# Patient Record
Sex: FEMALE | URBAN - METROPOLITAN AREA
[De-identification: names, ages, dates, MRNs, and addresses within clinical notes are randomized per-mention and may not be internally consistent; named-entity substitution may affect disease eponyms.]

---

## 2021-07-08 ENCOUNTER — TRANSFERRED RECORDS (OUTPATIENT)
Dept: HEALTH INFORMATION MANAGEMENT | Facility: CLINIC | Age: 36
End: 2021-07-08

## 2021-07-08 LAB
HPV ABSTRACT: NORMAL
PAP-ABSTRACT: NORMAL

## 2022-11-22 ENCOUNTER — OFFICE VISIT (OUTPATIENT)
Dept: INTERNAL MEDICINE | Facility: CLINIC | Age: 37
End: 2022-11-22
Payer: COMMERCIAL

## 2022-11-22 VITALS
DIASTOLIC BLOOD PRESSURE: 101 MMHG | BODY MASS INDEX: 28.24 KG/M2 | HEIGHT: 65 IN | SYSTOLIC BLOOD PRESSURE: 146 MMHG | OXYGEN SATURATION: 98 % | WEIGHT: 169.5 LBS | TEMPERATURE: 97.8 F | HEART RATE: 69 BPM

## 2022-11-22 DIAGNOSIS — I10 PRIMARY HYPERTENSION: ICD-10-CM

## 2022-11-22 DIAGNOSIS — F32.81 PMDD (PREMENSTRUAL DYSPHORIC DISORDER): ICD-10-CM

## 2022-11-22 DIAGNOSIS — R10.9 FLANK PAIN: Primary | ICD-10-CM

## 2022-11-22 LAB
ALBUMIN UR-MCNC: NEGATIVE MG/DL
APPEARANCE UR: CLEAR
BILIRUB UR QL STRIP: NEGATIVE
COLOR UR AUTO: NORMAL
GLUCOSE UR STRIP-MCNC: NEGATIVE MG/DL
HGB UR QL STRIP: NEGATIVE
KETONES UR STRIP-MCNC: NEGATIVE MG/DL
LEUKOCYTE ESTERASE UR QL STRIP: NEGATIVE
NITRATE UR QL: NEGATIVE
PH UR STRIP: 6 [PH] (ref 5–7)
RBC URINE: 1 /HPF
SP GR UR STRIP: 1 (ref 1–1.03)
SQUAMOUS EPITHELIAL: <1 /HPF
UROBILINOGEN UR STRIP-MCNC: NORMAL MG/DL
WBC URINE: <1 /HPF

## 2022-11-22 PROCEDURE — 99204 OFFICE O/P NEW MOD 45 MIN: CPT | Performed by: INTERNAL MEDICINE

## 2022-11-22 PROCEDURE — 81001 URINALYSIS AUTO W/SCOPE: CPT | Performed by: PATHOLOGY

## 2022-11-22 RX ORDER — LABETALOL 300 MG/1
300 TABLET, FILM COATED ORAL 2 TIMES DAILY
Qty: 60 TABLET | Refills: 3 | Status: SHIPPED | OUTPATIENT
Start: 2022-11-22 | End: 2023-07-13 | Stop reason: ALTCHOICE

## 2022-11-22 RX ORDER — NITROFURANTOIN 25; 75 MG/1; MG/1
CAPSULE ORAL
COMMUNITY
Start: 2022-10-15 | End: 2022-11-22

## 2022-11-22 RX ORDER — LABETALOL 200 MG/1
TABLET, FILM COATED ORAL
COMMUNITY
Start: 2022-09-16 | End: 2022-11-22

## 2022-11-22 RX ORDER — LABETALOL 200 MG/1
200 TABLET, FILM COATED ORAL 2 TIMES DAILY
COMMUNITY
Start: 2019-11-22 | End: 2022-11-22

## 2022-11-22 RX ORDER — NIFEDIPINE 60 MG/1
TABLET, EXTENDED RELEASE ORAL
COMMUNITY
Start: 2022-09-05 | End: 2022-11-22

## 2022-11-22 RX ORDER — VALACYCLOVIR HYDROCHLORIDE 500 MG/1
TABLET, FILM COATED ORAL
COMMUNITY
Start: 2022-09-05 | End: 2022-11-22

## 2022-11-22 RX ORDER — SENNOSIDES 8.6 MG
TABLET ORAL
COMMUNITY
Start: 2022-08-22 | End: 2024-02-13

## 2022-11-22 RX ORDER — VALACYCLOVIR HYDROCHLORIDE 500 MG/1
500 TABLET, FILM COATED ORAL PRN
COMMUNITY

## 2022-11-22 RX ORDER — AZELAIC ACID 0.15 G/G
GEL TOPICAL
COMMUNITY
Start: 2022-08-31 | End: 2022-11-22

## 2022-11-22 ASSESSMENT — ENCOUNTER SYMPTOMS
PALPITATIONS: 1
JAUNDICE: 0
INCREASED ENERGY: 1
HYPOTENSION: 0
BLOOD IN STOOL: 0
CHILLS: 0
WEIGHT GAIN: 0
HALLUCINATIONS: 0
NIGHT SWEATS: 0
BOWEL INCONTINENCE: 0
DIARRHEA: 0
COUGH DISTURBING SLEEP: 0
DIFFICULTY URINATING: 0
RECTAL PAIN: 0
NUMBNESS: 0
SPUTUM PRODUCTION: 0
POLYDIPSIA: 0
DYSPNEA ON EXERTION: 1
HEMOPTYSIS: 0
NAUSEA: 0
CONSTIPATION: 1
SLEEP DISTURBANCES DUE TO BREATHING: 0
COUGH: 0
POLYPHAGIA: 0
DECREASED APPETITE: 1
POSTURAL DYSPNEA: 0
VOMITING: 0
FATIGUE: 1
WEIGHT LOSS: 0
LOSS OF CONSCIOUSNESS: 0
FEVER: 0
DIZZINESS: 0
SHORTNESS OF BREATH: 1
LEG PAIN: 0
DISTURBANCES IN COORDINATION: 0
FLANK PAIN: 1
MEMORY LOSS: 0
HEARTBURN: 0
HYPERTENSION: 1
SEIZURES: 0
SNORES LOUDLY: 0
WHEEZING: 0
HEADACHES: 1
TINGLING: 0
SPEECH CHANGE: 0
TREMORS: 0
WEAKNESS: 0
SYNCOPE: 0
EXERCISE INTOLERANCE: 1
DYSURIA: 0
ORTHOPNEA: 0
LIGHT-HEADEDNESS: 0
ABDOMINAL PAIN: 0
BLOATING: 0
ALTERED TEMPERATURE REGULATION: 0
PARALYSIS: 0
HEMATURIA: 0

## 2022-11-22 NOTE — PATIENT INSTRUCTIONS
Increase labetalol to 300mg twice per day.  Check blood pressure at home at least three times per week and send a [employer] message in 2 weeks with an update.

## 2022-11-22 NOTE — PROGRESS NOTES
Assessment & Plan     Flank pain    Ines presents with ongoing R flank pain after being treated for UTI last month and was concerned about kidney infection.  U/A is normal today and exam shows some tenderness to palpation of the back, so pain is likely muscular.  Offered PT referral, but she is already scheduled for massage therapy and will proceed with this first.      - UA with Micro reflex to Culture; Future  - UA with Micro reflex to Culture    Primary hypertension    BP still high on labetalol 200mg BID.  She is trying to achieve pregnancy, so med options are limited.  Did not tolerate nifedipine due to side effects.  Discussed increasing labetalol, adding hydralazine or methyldopa.  We'll try increasing the labetalol first.  She will check BPs at home and send a [employer] message update in a couple of weeks.     - labetalol (NORMODYNE) 300 MG tablet; Take 1 tablet (300 mg) by mouth 2 times daily    PMDD (premenstrual dysphoric disorder)    Well controlled on current med, continue.     - FLUoxetine (PROZAC) 20 MG capsule; Take 1 capsule (20 mg) by mouth daily    Asked her to send vaccine records via [employer].      Thomas Tapia MD  [facility] INTERNAL MEDICINE [facility]   Ines is a 36 year old, presenting for the following health issues:  RECHECK (Establish care./Potential kidney infection.)    Recently moved here from [facility]     Genitourinary - Female  Onset/Duration: October  Description:   Painful urination (Dysuria): No           Frequency: No  Blood in urine (Hematuria): No.  Had some white flecks in the urine  Delay in urine (Hesitency): No  Intensity: mild  Progression of Symptoms:  Improved with nitrofurantoin  Accompanying Signs & Symptoms:  Fever/chills: No  Flank pain: YES- right, intermittent dull pain  Nausea and vomiting: No  Vaginal symptoms: none  Abdominal/Pelvic Pain: No  History:   History of frequent UTI s: YES- last was a couple of years ago.  She was  "previously on post-intercourse nitrofurantoin  History of kidney stones: No  Sexually Active: YES  Possibility of pregnancy: negative test on 11/14 and then she also got her period right after that  Precipitating or alleviating factors: None  Therapies tried and outcome:  She was seen in a [facility] in October with unclear results on urine test, treated with nitrofurantoin.  Continues to have flank pain    She is trying for pregnancy but has high blood pressure.  She is on labetalol 200mg BID;has not been on a higher dose in the past; she isn't sure how much this is helping.  Had swelling, photo sensitivity, migraines on nifedipine.      She was also on hydrochlorothiazide for a couple of years; didn't work very well. She was on atenolol-chlorthalidone in the past and this also helped migraines, but that was stopped with trying to achieve pregnancy.      She does not check BP at home often but when she does, it is high- usually around 130/150.  Just took an Excedrin with caffeine before this visit.    She is on fluoxetine for PMDD and that is working well.      She takes Valtrex prn for cold sores, can go for months to years without needing this.     She was previously on a statin for cholesterol but this is on hold while trying for pregnancy.  She is trying to eat healthy.  Cholesterol was checked in June while off medication and she says it was very high.      Last Pap was July 2021 per [facility].       Review of Systems   Constitutional,  systems are negative, except as otherwise noted.      Objective    BP (!) 146/101 (BP Location: Right arm, Patient Position: Sitting, Cuff Size: Adult Regular)   Pulse 69   Temp 97.8  F (36.6  C) (Oral)   Ht 1.651 m (5' 5\")   Wt 76.9 kg (169 lb 8 oz)   LMP 11/14/2022 (Exact Date)   SpO2 98%   BMI 28.21 kg/m    Body mass index is 28.21 kg/m .  Physical Exam     GENERAL: healthy, alert and no distress  RESP: lungs clear to auscultation - no rales, rhonchi or " wheezes  CV: regular rate and rhythm, normal S1 S2, no S3 or S4, no murmur, click or rub  ABDOMEN: soft, non-tender, no hepatosplenomegaly, no masses and bowel sounds normal  BACK: no CVA tenderness, has some mild tenderness to palpation of the lower right thoracic back    Results for orders placed or performed in visit on 11/22/22 (from the past 24 hour(s))   UA with Micro reflex to Culture    Specimen: Urine, Midstream   Result Value Ref Range    Color Urine Straw Colorless, Straw, Light Yellow, Yellow    Appearance Urine Clear Clear    Glucose Urine Negative Negative mg/dL    Bilirubin Urine Negative Negative    Ketones Urine Negative Negative mg/dL    Specific Gravity Urine 1.003 1.003 - 1.035    Blood Urine Negative Negative    pH Urine 6.0 5.0 - 7.0    Protein Albumin Urine Negative Negative mg/dL    Urobilinogen Urine Normal Normal, 2.0 mg/dL    Nitrite Urine Negative Negative    Leukocyte Esterase Urine Negative Negative    RBC Urine 1 <=2 /HPF    WBC Urine <1 <=5 /HPF    Squamous Epithelials Urine <1 <=1 /HPF    Narrative    Urine Culture not indicated

## 2022-11-23 NOTE — NURSING NOTE
"Ines Conte is a 36 year old female patient that presents today in clinic for the following:    Chief Complaint   Patient presents with     RECHECK     Establish care.  Potential kidney infection.     The patient's allergies and medications were reviewed as noted. A set of vitals were recorded as noted without incident: BP (!) 146/101 (BP Location: Right arm, Patient Position: Sitting, Cuff Size: Adult Regular)   Pulse 69   Temp 97.8  F (36.6  C) (Oral)   Ht 1.651 m (5' 5\")   Wt 76.9 kg (169 lb 8 oz)   LMP 11/14/2022 (Exact Date)   SpO2 98%   BMI 28.21 kg/m  . The patient does not have any other questions for the provider.    Sneha Salcedo, EMT at 6:39 PM on 11/22/2022.  Primary care clinic: [phone]  "

## 2023-06-21 ENCOUNTER — TELEPHONE (OUTPATIENT)
Dept: INTERNAL MEDICINE | Facility: CLINIC | Age: 38
End: 2023-06-21
Payer: COMMERCIAL

## 2023-06-21 NOTE — TELEPHONE ENCOUNTER
[facility] Call Center    Phone Message    May a detailed message be left on voicemail: yes     Reason for Call: Other: Patient calling requsting a call back to discuss medication changes. Please call thank you.      Action Taken: Message routed to:  [facility] pcc    Travel Screening: Not Applicable

## 2023-06-22 NOTE — TELEPHONE ENCOUNTER
RN called patient and she relayed she thinks she needs to have B/P med adjustment and is also willing to start taking a statin if needed.  She had been trying to get pregnant up until now, but thinks it is time to seriously address hypertension and if she needs to take a statin for hyperlipidemia.  RN helped patient make a visit with a provider for next week.  Patient was willing to see a resident sooner since Dr. Tapia did not have any soon appts.    Noreen Arias RN on 6/22/2023 at 10:17 AM

## 2023-06-29 ENCOUNTER — LAB (OUTPATIENT)
Dept: LAB | Facility: CLINIC | Age: 38
End: 2023-06-29
Payer: COMMERCIAL

## 2023-06-29 ENCOUNTER — OFFICE VISIT (OUTPATIENT)
Dept: INTERNAL MEDICINE | Facility: CLINIC | Age: 38
End: 2023-06-29
Payer: COMMERCIAL

## 2023-06-29 VITALS
HEART RATE: 79 BPM | DIASTOLIC BLOOD PRESSURE: 114 MMHG | SYSTOLIC BLOOD PRESSURE: 155 MMHG | OXYGEN SATURATION: 97 % | HEIGHT: 65 IN | WEIGHT: 165.8 LBS | BODY MASS INDEX: 27.63 KG/M2

## 2023-06-29 DIAGNOSIS — G47.9 SLEEP DISTURBANCES: ICD-10-CM

## 2023-06-29 DIAGNOSIS — Z11.3 SCREEN FOR STD (SEXUALLY TRANSMITTED DISEASE): ICD-10-CM

## 2023-06-29 DIAGNOSIS — E11.9 TYPE 2 DIABETES MELLITUS WITHOUT COMPLICATION, WITHOUT LONG-TERM CURRENT USE OF INSULIN (H): ICD-10-CM

## 2023-06-29 DIAGNOSIS — Z11.4 SCREENING FOR HIV (HUMAN IMMUNODEFICIENCY VIRUS): ICD-10-CM

## 2023-06-29 DIAGNOSIS — Z30.430 ENCOUNTER FOR IUD INSERTION: ICD-10-CM

## 2023-06-29 DIAGNOSIS — E78.00 HYPERCHOLESTEREMIA: ICD-10-CM

## 2023-06-29 DIAGNOSIS — Z11.4 SCREENING FOR HIV (HUMAN IMMUNODEFICIENCY VIRUS): Primary | ICD-10-CM

## 2023-06-29 DIAGNOSIS — I10 PRIMARY HYPERTENSION: ICD-10-CM

## 2023-06-29 LAB
CHOLEST SERPL-MCNC: 297 MG/DL
FASTING STATUS PATIENT QL REPORTED: YES
GLUCOSE SERPL-MCNC: 102 MG/DL (ref 70–99)
HDLC SERPL-MCNC: 49 MG/DL
LDLC SERPL CALC-MCNC: 205 MG/DL
NONHDLC SERPL-MCNC: 248 MG/DL
TRIGL SERPL-MCNC: 214 MG/DL

## 2023-06-29 PROCEDURE — 87591 N.GONORRHOEAE DNA AMP PROB: CPT | Performed by: INTERNAL MEDICINE

## 2023-06-29 PROCEDURE — 87389 HIV-1 AG W/HIV-1&-2 AB AG IA: CPT | Performed by: INTERNAL MEDICINE

## 2023-06-29 PROCEDURE — 80061 LIPID PANEL: CPT | Performed by: PATHOLOGY

## 2023-06-29 PROCEDURE — 36415 COLL VENOUS BLD VENIPUNCTURE: CPT | Performed by: PATHOLOGY

## 2023-06-29 PROCEDURE — 82947 ASSAY GLUCOSE BLOOD QUANT: CPT | Performed by: PATHOLOGY

## 2023-06-29 PROCEDURE — 86780 TREPONEMA PALLIDUM: CPT | Performed by: INTERNAL MEDICINE

## 2023-06-29 PROCEDURE — 99214 OFFICE O/P EST MOD 30 MIN: CPT | Mod: GC

## 2023-06-29 PROCEDURE — 87491 CHLMYD TRACH DNA AMP PROBE: CPT | Performed by: INTERNAL MEDICINE

## 2023-06-29 PROCEDURE — 99000 SPECIMEN HANDLING OFFICE-LAB: CPT | Performed by: PATHOLOGY

## 2023-06-29 RX ORDER — ATENOLOL AND CHLORTHALIDONE TABLET 100; 25 MG/1; MG/1
1 TABLET ORAL DAILY
Qty: 30 TABLET | Refills: 3 | Status: SHIPPED | OUTPATIENT
Start: 2023-06-29 | End: 2023-07-13

## 2023-06-29 NOTE — NURSING NOTE
Ines Conte is a 37 year old female patient that presents today in clinic for the following:    Chief Complaint   Patient presents with     Hypertension     Recheck Medication     Pt would like to discuss restarting atenolol-chlorthalidone and a starting a statin     Sleep Problem     Pt states stiffness while sleeping     Migraine     Pt reports occasional migraines     The patient's allergies and medications were reviewed as noted. A set of vitals were recorded as noted without incident. The patient does not have any other questions for the provider.    Rosemarie Keene, EMT at 1:04 PM on 6/29/2023

## 2023-06-29 NOTE — ASSESSMENT & PLAN NOTE
Would like to have referral to a provider for copper IUD insertion. Had a copper IUD in the past until 2019. She tolerated the IUD well and denies any increased in menstrual bleeding or cramping. Her menstrual cycles continue to be regular.    ASSESSMENT&PLAN:   - Ordered referral to OB/GYN Women's Clinic at the [facility] to establish care and for IUD insertion.

## 2023-06-29 NOTE — ASSESSMENT & PLAN NOTE
Patient has had HTN chronically that was well controlled in the past with atenolol-Chlorthalidone. However she discontinued medication due to wanting to conceive a child. She was unsuccessful and experienced a SAB. During her process to become pregnant, her BP was less controlled. She comes today wanting to restart her old BP medication to gain better BP control. Denies headache, vision changes, nausea, vomiting.     OBJECTIVE: BP today was 155/114. BP has been elevated on past checks since 2019. Physical exam showed normal heart and lung sounds. No BLE edema.     ASSESSMENT&PLAN:   - Ordered Atenolol-Chlorthalidone 100-25mg once daily   - Pt has BP cuff at home, advised to keep track of BP and gave return precautions if experiences lightheadedness or dizziness.

## 2023-06-29 NOTE — ASSESSMENT & PLAN NOTE
Pt states she noticed odor from right armpit that was worse than her left.  Come to clinic wondering if this could be treated medically. States she requires extra de-odorant application on right vs left.     OBJECTIVE:     ASSESSMENT&PLAN:   Likely physiological body odor differences and not pathologic. However, predisposition for odor could indicate underlying elevated blood glucose. Would also benefit from T2DM screening since she is fasting today. BG of 102 places in pre-diabetes range. Will continue to monitor

## 2023-06-29 NOTE — ASSESSMENT & PLAN NOTE
Pt has FMHx significant for hypercholesteremia and HTN. States that past lipid checks have shown elevated cholesterol, but have not been on statins due to wanting to get pregnant. However, she would like to have her lipid checked today and possibly be placed on statins. Has not taken statins prior.     OBJECTIVE: no xanthomas on physical exam. No prior lipids available in chart to compare. Labs today showed cholesterol 297, HDL 49, .     ASSESSMENT&PLAN:   ASCVD guidelines show no statins are indicated at this time due to age <40 despite high levels of cholesterol. Will hold on prescribing statins at this time.

## 2023-06-29 NOTE — ASSESSMENT & PLAN NOTE
ASSESSMENT&PLAN:   Advised continued lifestyle changes and sleep hygiene measures. Suggested taking melatonin on a more regular schedule to see if symptoms improve.

## 2023-06-29 NOTE — PATIENT INSTRUCTIONS
We've started you on Atenolol-Chlorthalidone 100-25mg once daily. Please measure your blood pressure when you begin taking the medication to monitor how it impacts you. If you notice any dizziness, lightheadedness, and fainting you may be having low blood pressure and place let us know right away.     We've sent you a referral for the women's clinic at the [facility] to establish care with an OB/GYN.

## 2023-06-29 NOTE — PROGRESS NOTES
"Dear patient. Thank you for visiting with me. I want you to feel respected, understood, and empowered. \"Respect\" is valuing you as much as I would a close family member. \"Empowerment\" happens when you are fully informed, and can make the best possible decision for you.  Please ask me questions!  Challenge anything that is not clear.    Medical records are primarily used as memory aids for me and my colleagues. Things to know about my documentation style:  - The 'problem list' includes current symptoms or diagnoses, and some problems that are resolved but may return. I use the past medical history for problems not expected to return.  - I use single quotation marks for things that you or I said, when I want to clarify who was speaking.  - I use double quotation marks when copying a term from elsewhere in your records. Italics (besides here) may also denote a quotation.  If you have questions or concerns, please contact me; I will reply as soon as time allows.    Context    Ines Conte is a 37 year old woman, with concerns including:  Chief Complaint   Patient presents with     Hypertension     Recheck Medication     Pt would like to discuss restarting atenolol-chlorthalidone and a starting a statin     Sleep Problem     Pt states stiffness while sleeping     Migraine     Pt reports occasional migraines     PCP: Thomas Tapia   Visit type: dual-purpose (preventive, with additional time for evaluation & management of one or more problems)    BP (!) 155/114   Pulse 79   Ht 1.651 m (5' 5\")   Wt 75.2 kg (165 lb 12.8 oz)   SpO2 97%   BMI 27.59 kg/m      Problems and progress    Problem List as of 6/29/2023 Reviewed: 6/29/2023  1:08 PM by Chidi Thompson MD          Noted    1. High blood pressure 11/22/2002     Last Assessment & Plan 6/29/2023 Office Visit Written 6/29/2023  3:52 PM by hCidi Thompson MD      Patient has had HTN chronically that was well controlled in the past with atenolol-Chlorthalidone. However " she discontinued medication due to wanting to conceive a child. She was unsuccessful and experienced a SAB. During her process to become pregnant, her BP was less controlled. She comes today wanting to restart her old BP medication to gain better BP control. Denies headache, vision changes, nausea, vomiting.     OBJECTIVE: BP today was 155/114. BP has been elevated on past checks since 2019. Physical exam showed normal heart and lung sounds. No BLE edema.     ASSESSMENT&PLAN:   - Ordered Atenolol-Chlorthalidone 100-25mg once daily   - Pt has BP cuff at home, advised to keep track of BP and gave return precautions if experiences lightheadedness or dizziness.           Relevant Medications     atenolol-chlorthalidone (TENORETIC) 100-25 MG tablet    2. Hypercholesteremia 6/29/2023     Last Assessment & Plan 6/29/2023 Office Visit Written 6/29/2023  3:58 PM by Chidi Thompson MD      Pt has FMHx significant for hypercholesteremia and HTN. States that past lipid checks have shown elevated cholesterol, but have not been on statins due to wanting to get pregnant. However, she would like to have her lipid checked today and possibly be placed on statins. Has not taken statins prior.     OBJECTIVE: no xanthomas on physical exam. No prior lipids available in chart to compare. Labs today showed cholesterol 297, HDL 49, .     ASSESSMENT&PLAN:   ASCVD guidelines show no statins are indicated at this time due to age <40 despite high levels of cholesterol. Will hold on prescribing statins at this time.           Relevant Orders     Lipid panel reflex to direct LDL Fasting (Completed)    3. Encounter for IUD insertion 6/29/2023     Last Assessment & Plan 6/29/2023 Office Visit Written 6/29/2023  3:59 PM by Chidi Thompson MD      Would like to have referral to a provider for copper IUD insertion. Had a copper IUD in the past until 2019. She tolerated the IUD well and denies any increased in menstrual bleeding or cramping. Her  menstrual cycles continue to be regular.    ASSESSMENT&PLAN:   - Ordered referral to OB/GYN Women's Clinic at the [facility] to establish care and for IUD insertion.           Relevant Orders     Ob/Gyn Referral    4. Screening for HIV (human immunodeficiency virus) - Primary 6/29/2023     Relevant Orders     HIV Screening    5. Screen for STD (sexually transmitted disease) 6/29/2023     Relevant Orders     NEISSERIA GONORRHOEA PCR     CHLAMYDIA TRACHOMATIS PCR     Treponema Abs w Reflex to RPR and Titer    6. Type 2 diabetes mellitus without complication, without long-term current use of insulin (H) 6/29/2023     Last Assessment & Plan 6/29/2023 Office Visit Edited 6/29/2023  4:04 PM by Chidi Thompson MD      Pt states she noticed odor from right armpit that was worse than her left.  Come to clinic wondering if this could be treated medically. States she requires extra de-odorant application on right vs left.     OBJECTIVE:     ASSESSMENT&PLAN:   Likely physiological body odor differences and not pathologic. However, predisposition for odor could indicate underlying elevated blood glucose. Would also benefit from T2DM screening since she is fasting today. BG of 102 places in pre-diabetes range. Will continue to monitor           Relevant Orders     Glucose (Completed)    7. Jaw and neck stiffness while asleep 6/29/2023     Overview Signed 6/29/2023  4:06 PM by Chidi Thompson MD      Pt states she experiences neck and jaw stiffness without teeth grinding or snoring while asleep. She wakes up occasionally with sore muscles. Denies any episodes of waking due to difficulties breathing. Does not have hx of MARTY. Has tried lifestyle changes and OTC melatonin occasionally to help with sleep with some benefit.               Last Assessment & Plan 6/29/2023 Office Visit Written 6/29/2023  4:08 PM by Chidi Thompson MD      ASSESSMENT&PLAN:   Advised continued lifestyle changes and sleep hygiene measures. Suggested taking  melatonin on a more regular schedule to see if symptoms improve.            Physical Exam  Constitutional:       Appearance: Normal appearance. She is normal weight.   HENT:      Right Ear: External ear normal.      Left Ear: External ear normal.      Mouth/Throat:      Mouth: Mucous membranes are moist.      Pharynx: Oropharynx is clear.   Eyes:      Extraocular Movements: Extraocular movements intact.      Pupils: Pupils are equal, round, and reactive to light.   Cardiovascular:      Rate and Rhythm: Normal rate and regular rhythm.      Pulses: Normal pulses.      Heart sounds: Normal heart sounds.   Pulmonary:      Effort: Pulmonary effort is normal.      Breath sounds: Normal breath sounds.   Abdominal:      General: Bowel sounds are normal. There is no distension.      Tenderness: There is no abdominal tenderness.   Neurological:      General: No focal deficit present.      Mental Status: She is alert and oriented to person, place, and time.       --------------------------------------------  History reviewed. No pertinent past medical history.  History reviewed. No pertinent surgical history.  History reviewed. No pertinent family history.    Chidi Thompson MD ([place])  Medicine PGY-1   Pager # [phone]

## 2023-06-30 ENCOUNTER — TELEPHONE (OUTPATIENT)
Dept: CARE COORDINATION | Facility: CLINIC | Age: 38
End: 2023-06-30
Payer: COMMERCIAL

## 2023-06-30 DIAGNOSIS — E11.9 TYPE 2 DIABETES MELLITUS WITHOUT COMPLICATION, WITHOUT LONG-TERM CURRENT USE OF INSULIN (H): Primary | ICD-10-CM

## 2023-06-30 DIAGNOSIS — E78.00 HYPERCHOLESTEREMIA: ICD-10-CM

## 2023-06-30 LAB
C TRACH DNA SPEC QL NAA+PROBE: NEGATIVE
HIV 1+2 AB+HIV1 P24 AG SERPL QL IA: NONREACTIVE
N GONORRHOEA DNA SPEC QL NAA+PROBE: NEGATIVE
T PALLIDUM AB SER QL: NONREACTIVE

## 2023-06-30 NOTE — TELEPHONE ENCOUNTER
Called patient- discussed statin. She endorses that she was rx'd a statin by previous provider but did not start due to pregnancy- is continuing working on diet. 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 Gave patient reassurance that the lipid/ cholesterol levels aren't always patients who eat fried foods/ unhealthy or are overweight as she had previous providers make her feel this way. Discussed glucose as well, counseled on dietary concerns and recommended continuing exercise and cutting down on excess carbs where she can and we will follow this with annual labs. Patient also interested in a dietician referral.     Charles Cordero RN on 6/30/2023 at 11:15 AM

## 2023-06-30 NOTE — TELEPHONE ENCOUNTER
Shakeel Soto,     I placed a nutrition referral for patient. I'll leave message in Dr. Tapia's inbasket when she returns.     Juan Carlos Lopez MD  Internal Medicine  Primary Care Clinic, [place], [place]

## 2023-07-03 ENCOUNTER — TELEPHONE (OUTPATIENT)
Dept: INTERNAL MEDICINE | Facility: CLINIC | Age: 38
End: 2023-07-03
Payer: COMMERCIAL

## 2023-07-03 DIAGNOSIS — E78.00 HYPERCHOLESTEREMIA: Primary | ICD-10-CM

## 2023-07-03 RX ORDER — ATORVASTATIN CALCIUM 20 MG/1
40 TABLET, FILM COATED ORAL DAILY
Qty: 30 TABLET | Refills: 3 | Status: SHIPPED | OUTPATIENT
Start: 2023-07-03 | End: 2023-07-26

## 2023-07-03 NOTE — TELEPHONE ENCOUNTER
Telephone encounter     Called to update her on consideration for statin therapy given her elevated lipids. Though she is under 40, her LDL is 205, indicating high-intensity statin therapy.     Pt is doing well on atenolol-chlorthalidone and measuring BP at home. Stated she'd like to begin her statins after her BP is better controlled. Discussed benefits of starting statins given lipid labs and sent a prescription of Atorvastatin 40mg.

## 2023-07-13 ENCOUNTER — VIRTUAL VISIT (OUTPATIENT)
Dept: INTERNAL MEDICINE | Facility: CLINIC | Age: 38
End: 2023-07-13
Payer: COMMERCIAL

## 2023-07-13 DIAGNOSIS — R79.89 ABNORMAL TSH: ICD-10-CM

## 2023-07-13 DIAGNOSIS — I10 PRIMARY HYPERTENSION: Primary | ICD-10-CM

## 2023-07-13 PROCEDURE — 99213 OFFICE O/P EST LOW 20 MIN: CPT | Mod: VID | Performed by: INTERNAL MEDICINE

## 2023-07-13 RX ORDER — ATENOLOL AND CHLORTHALIDONE TABLET 100; 25 MG/1; MG/1
1 TABLET ORAL DAILY
Qty: 90 TABLET | Refills: 3 | Status: SHIPPED | OUTPATIENT
Start: 2023-07-13 | End: 2024-06-25

## 2023-07-13 RX ORDER — LOSARTAN POTASSIUM 25 MG/1
25 TABLET ORAL DAILY
Qty: 30 TABLET | Refills: 0 | Status: SHIPPED | OUTPATIENT
Start: 2023-07-13 | End: 2023-07-28

## 2023-07-13 NOTE — NURSING NOTE
Is the patient currently in the state of [place]? YES    Visit mode:VIDEO    If the visit is dropped, the patient can be reconnected by: VIDEO VISIT: Text to cell phone: [phone]    Will anyone else be joining the visit? NO      How would you like to obtain your AVS? MyChart    Are changes needed to the allergy or medication list? NO    Reason for visit: RECHECK (Blood pressure medication)    Ariadne MARES

## 2023-07-13 NOTE — PROGRESS NOTES
"Ines is a 37 year old who is being evaluated via a billable video visit.      How would you like to obtain your AVS? MyChart  If the video visit is dropped, the invitation should be resent by: Text to cell phone: [phone]  Will anyone else be joining your video visit? No          Assessment & Plan     Primary hypertension    Ines is feeling better since resuming the atenolol-chlorthalidone but BP is still high, so we discussed adding another medication.  She is no longer trying to achieve pregnancy, so we have more medication options available.  She did not tolerated nifedipine in the past, so we discussed trying an ACE or ARB rather than CCB next.  We'll add in losartan 25mg and recheck labs and BP in 2 weeks.     - Basic metabolic panel  (Ca, Cl, CO2, Creat, Gluc, K, Na, BUN); Future  - losartan (COZAAR) 25 MG tablet; Take 1 tablet (25 mg) by mouth daily  - atenolol-chlorthalidone (TENORETIC) 100-25 MG tablet; Take 1 tablet by mouth daily    Abnormal TSH    Ines notes having borderline hyperthyroidism in the past and wonders if this may be contributing to her HTN.  She says labs were last checked in [place] in 2022, so we'll recheck now to see where she is at.     - TSH with free T4 reflex; Future      Return in about 2 weeks (around 7/27/2023).    Thomas Tapia MD  [facility] INTERNAL MEDICINE [place]    Subjective   Ines is a 37 year old, presenting for the following health issues:  RECHECK (Blood pressure medication)    HPI     Ines was seen recently in resident clinic on 6/29: \"Patient has had HTN chronically that was well controlled in the past with atenolol-Chlorthalidone. However she discontinued medication due to wanting to conceive a child. She was unsuccessful and experienced a SAB. During her process to become pregnant, her BP was less controlled. She comes today wanting to restart her old BP medication to gain better BP control.\"    She called yesterday to report that " the atenolol-chlorthalidone was helping with migraines and she is able to be more active but BP was still high (though improved).  She was already at a 100mg-25mg dose, so I advised a visit to discuss additional medication options.  She does wish to continue the current med since it has helped migraines and add another med to it.    She has previously been on labetalol and nifedipine while trying to conceive.  Had swelling, photo sensitivity, migraines on nifedipine.  Previously was on hydrochlorothiazide but it was not very effective.      Ines reports a prior history of borderline hyperthyroidism     She has not yet started atorvastatin since she just wants to start one medicaiton at a time.     Review of Systems   Constitutional, CV systems are negative, except as otherwise noted.      Objective           Vitals:  No vitals were obtained today due to virtual visit.    Physical Exam   GENERAL: Healthy, alert and no distress  EYES: Eyes grossly normal to inspection.  No discharge or erythema, or obvious scleral/conjunctival abnormalities.  RESP: No audible wheeze, cough, or visible cyanosis.  No visible retractions or increased work of breathing.    SKIN: Visible skin clear. No significant rash, abnormal pigmentation or lesions.  NEURO: Cranial nerves grossly intact.  Mentation and speech appropriate for age.  PSYCH: Mentation appears normal, affect normal/bright, judgement and insight intact, normal speech and appearance well-groomed.            Video-Visit Details    Type of service:  Video Visit   Start time: 1006am  End time: 1013am    Originating Location (pt. Location): Home/Work    Distant Location (provider location):  Off-site  Platform used for Video Visit: [employer]

## 2023-07-13 NOTE — PATIENT INSTRUCTIONS
Thank you for visiting the [facility] today at the [facility]! The following is some information about our clinic:     Primary Care Center Frequently-Asked Questions    (1) How do I schedule appointments at the [facility]?     Primary Care--to schedule or make changes to an existing appointment, please call our primary care line at [phone].    Labs--to schedule a lab appointment at the [facility] you can use Now[employer] or call [phone]. If you have a [place] location that is closer to home, you can reach out to that location for scheduling options.     Imaging--if you need to schedule a CT, X-ray, MRI, ultrasound, or other imaging study you can call [phone] to schedule at the [facility] or any other [facility] imaging location.     Referrals--if a referral to another specialty was ordered you can expect a phone call from their scheduling team. If you have not heard from them in a week, please call us or send us a NowForce message to check the status or get a scheduling number. Please note that this only applies to internal [facility] referrals. If the referral is external you would need to contact their office for scheduling.     (2) I have a question about my visit, who do I contact?     You can call us at the primary care line at [phone] to ask questions about your visit. You can also send a secure message through NowForce, which is reviewed by clinic staff. Please note that NowForce messages have a twenty-four to forty-eight business hour turnaround time and should not be used for urgent concerns.    (3) How will I get the results of my tests?    If you are signed up for [employer] all tests will be released to you within twenty-four hours of resulting. Please allow three to five days for your doctor to review your results and place a note interpreting the results. If you do not have [facility] you will receive your  results through mail seven to ten business days following the return of the tests. Please note that if there should be any urgent or concerning results that your doctor or their registered nurse will reach out to you the same day as the tests come back. If you have follow up questions about your results or would like to discuss the results in detail please schedule a follow up with your provider either in person or virtually.     (4) How do I get refills of my prescriptions?     You should always first contact your pharmacy for refills of your medications. If submitting a refill request on [employer], please be sure to submit the request only once--repeat requests can cause delays in refill. If you are requesting a NEW medication or a medication related to new symptoms you will need to schedule an appointment with a provider prior to approval. Please note: Routine medication refills have up to one to three business day turnaround whereas controlled substances refills have up to five to seven business day turnaround.    (5) I have new symptoms, what do I do?     If you are having an immediate medical emergency, you should dial 911 for assistance.   For anything urgent that needs to be seen within a few hours to one day you should visit a local urgent care for assistance.  For non-urgent symptoms that need to be seen within a few days to a week you can schedule with an available provider in primary care by going to [facility] or calling [phone].   If you are not sure how serious your symptoms are or you would like to receive medical advice you can always call [phone] to speak with a triage nurse.

## 2023-07-14 ENCOUNTER — MYC MEDICAL ADVICE (OUTPATIENT)
Dept: INTERNAL MEDICINE | Facility: CLINIC | Age: 38
End: 2023-07-14
Payer: COMMERCIAL

## 2023-07-17 ENCOUNTER — OFFICE VISIT (OUTPATIENT)
Dept: INTERNAL MEDICINE | Facility: CLINIC | Age: 38
End: 2023-07-17
Payer: COMMERCIAL

## 2023-07-17 ENCOUNTER — LAB (OUTPATIENT)
Dept: LAB | Facility: CLINIC | Age: 38
End: 2023-07-17
Payer: COMMERCIAL

## 2023-07-17 ENCOUNTER — NURSE TRIAGE (OUTPATIENT)
Dept: NURSING | Facility: CLINIC | Age: 38
End: 2023-07-17

## 2023-07-17 VITALS
HEART RATE: 73 BPM | WEIGHT: 154 LBS | OXYGEN SATURATION: 97 % | BODY MASS INDEX: 25.63 KG/M2 | DIASTOLIC BLOOD PRESSURE: 92 MMHG | SYSTOLIC BLOOD PRESSURE: 131 MMHG

## 2023-07-17 DIAGNOSIS — R73.01 IMPAIRED FASTING GLUCOSE: Primary | ICD-10-CM

## 2023-07-17 DIAGNOSIS — R19.5 CHANGE IN STOOL: ICD-10-CM

## 2023-07-17 DIAGNOSIS — R73.01 IMPAIRED FASTING GLUCOSE: ICD-10-CM

## 2023-07-17 DIAGNOSIS — E87.8 ELECTROLYTE ABNORMALITY: ICD-10-CM

## 2023-07-17 DIAGNOSIS — R79.89 ABNORMAL TSH: ICD-10-CM

## 2023-07-17 DIAGNOSIS — K58.2 IRRITABLE BOWEL SYNDROME WITH BOTH CONSTIPATION AND DIARRHEA: ICD-10-CM

## 2023-07-17 LAB
ALBUMIN SERPL BCG-MCNC: 4.9 G/DL (ref 3.5–5.2)
ALBUMIN UR-MCNC: NEGATIVE MG/DL
ALP SERPL-CCNC: 58 U/L (ref 35–104)
ALT SERPL W P-5'-P-CCNC: 10 U/L (ref 0–50)
ANION GAP SERPL CALCULATED.3IONS-SCNC: 15 MMOL/L (ref 7–15)
APPEARANCE UR: CLEAR
AST SERPL W P-5'-P-CCNC: 19 U/L (ref 0–45)
BACTERIA #/AREA URNS HPF: ABNORMAL /HPF
BILIRUB SERPL-MCNC: 0.7 MG/DL
BILIRUB UR QL STRIP: NEGATIVE
BUN SERPL-MCNC: 10.6 MG/DL (ref 6–20)
CALCIUM SERPL-MCNC: 9.8 MG/DL (ref 8.6–10)
CHLORIDE SERPL-SCNC: 91 MMOL/L (ref 98–107)
COLOR UR AUTO: ABNORMAL
CREAT SERPL-MCNC: 0.78 MG/DL (ref 0.51–0.95)
DEPRECATED HCO3 PLAS-SCNC: 29 MMOL/L (ref 22–29)
GFR SERPL CREATININE-BSD FRML MDRD: >90 ML/MIN/1.73M2
GLUCOSE SERPL-MCNC: 97 MG/DL (ref 70–99)
GLUCOSE UR STRIP-MCNC: NEGATIVE MG/DL
HBA1C MFR BLD: 5.1 %
HGB UR QL STRIP: NEGATIVE
KETONES UR STRIP-MCNC: 80 MG/DL
LEUKOCYTE ESTERASE UR QL STRIP: NEGATIVE
LIPASE SERPL-CCNC: 34 U/L (ref 13–60)
NITRATE UR QL: NEGATIVE
PH UR STRIP: 6 [PH] (ref 5–7)
POTASSIUM SERPL-SCNC: 3.3 MMOL/L (ref 3.4–5.3)
PROT SERPL-MCNC: 8.1 G/DL (ref 6.4–8.3)
RBC URINE: 1 /HPF
SODIUM SERPL-SCNC: 135 MMOL/L (ref 136–145)
SP GR UR STRIP: 1.01 (ref 1–1.03)
SQUAMOUS EPITHELIAL: 1 /HPF
UROBILINOGEN UR STRIP-MCNC: NORMAL MG/DL
WBC URINE: <1 /HPF

## 2023-07-17 PROCEDURE — 83036 HEMOGLOBIN GLYCOSYLATED A1C: CPT | Performed by: PEDIATRICS

## 2023-07-17 PROCEDURE — 99000 SPECIMEN HANDLING OFFICE-LAB: CPT | Performed by: PATHOLOGY

## 2023-07-17 PROCEDURE — 99214 OFFICE O/P EST MOD 30 MIN: CPT | Performed by: PEDIATRICS

## 2023-07-17 PROCEDURE — 86803 HEPATITIS C AB TEST: CPT | Performed by: PEDIATRICS

## 2023-07-17 PROCEDURE — 84443 ASSAY THYROID STIM HORMONE: CPT | Performed by: PATHOLOGY

## 2023-07-17 PROCEDURE — 84300 ASSAY OF URINE SODIUM: CPT | Performed by: PEDIATRICS

## 2023-07-17 PROCEDURE — 82570 ASSAY OF URINE CREATININE: CPT | Performed by: PATHOLOGY

## 2023-07-17 PROCEDURE — 81001 URINALYSIS AUTO W/SCOPE: CPT | Performed by: PATHOLOGY

## 2023-07-17 PROCEDURE — 82436 ASSAY OF URINE CHLORIDE: CPT | Performed by: PEDIATRICS

## 2023-07-17 PROCEDURE — 83690 ASSAY OF LIPASE: CPT | Performed by: PATHOLOGY

## 2023-07-17 PROCEDURE — 36415 COLL VENOUS BLD VENIPUNCTURE: CPT | Performed by: PATHOLOGY

## 2023-07-17 PROCEDURE — 80053 COMPREHEN METABOLIC PANEL: CPT | Performed by: PATHOLOGY

## 2023-07-17 NOTE — NURSING NOTE
Chief Complaint   Patient presents with     Recheck Medication     Seen a few weeks ago for bp and had bloodwork done - found high blood sugar     stool     Noticed yellow color in stool, concerned of pancreatitis     Pain     Would like to discuss lower back pain       Varun Anglin CMA, EMT at 3:56 PM on 7/17/2023.

## 2023-07-17 NOTE — TELEPHONE ENCOUNTER
"Nurse Triage SBAR    Is this a 2nd Level Triage? YES, LICENSED PRACTITIONER REVIEW IS REQUIRED    Situation:   pt with right flank pain, that is currently resolved, however concern with yellow stool, that leeches into the bowel yellow and on toilet tissue yellow.  No abd pain or bloating.      Background:   Patient is seeing Dr. Yoo for High blood pressure, yesterdays reading at 1100 was 151/99  Then later was  144/91  Patient is concerned with right flank pain that she has been having, wich at this current time she does not have.  This comes and goes  Her urine has been somewhat dark with some flecks in it.  She states that she is staying hydrated, but thirsty still.  She has never been diagnosed with DM.per pt report.  Her main concern is her stool.  It is yellow and it leeches yellow into the bowel and onto the tissue paper.  No blood noted.  No abdominal pain or bloating.  When asked about consistency, she states \"an unsatisfying log\"    She denies gallbladder issues in the past.  She did some reading and is afraid to eat anything it this turns out to be Pancreatitis.  Her diet is whole wheat bread, chicken breast and vegetables.  Which has lead to a weight loss for her.   She denies fever or chills.   She has not started on the new medication that was prescribed by Dr. Tapia, she is aware of labs to be done as well.       Assessment:   Patient needs to be seen, but does not need an ED, which is what she is asking about.    Protocol Recommended Disposition:  RN route to IM for an appintment    Recommendation:   Keep hydrated     Routed to provider           Reason for Disposition    Patient wants to be seen    Additional Information    Negative: Passed out (i.e., lost consciousness, collapsed and was not responding)    Negative: Shock suspected (e.g., cold/pale/clammy skin, too weak to stand, low BP, rapid pulse)    Negative: Sounds like a life-threatening emergency to the triager    Negative: Followed a major " "injury to the back (e.g., MVA, fall > 10 feet or 3 meters, penetrating injury, etc.)    Negative: Upper, mid or lower back pain that occurs mainly in the midline    Negative: SEVERE pain (e.g., excruciating, scale 8-10) and present > 1 hour    Negative: Sudden onset of severe flank pain and age > 60 years    Negative: Abdominal pain and age > 60 years    Negative: Unable to urinate (or only a few drops) > 4 hours and bladder feels very full (e.g., palpable bladder or strong urge to urinate)    Negative: Pain radiates into groin, scrotum    Negative: Blood in urine (red, pink, or tea-colored)    Negative: Vomiting    Negative: Weakness of a leg or foot (e.g., unable to bear weight, dragging foot)    Negative: Patient sounds very sick or weak to the triager    Negative: Fever > 100.4 F (38.0 C)    Negative: Pain or burning with passing urine (urination)    Negative: MODERATE pain (e.g., interferes with normal activities or awakens from sleep)    Negative: Painful rash with multiple small blisters grouped together (i.e., dermatomal distribution or 'band' or 'stripe')    Negative: Pregnant  (Exception: Mild pain that is only present with movement.)    Negative: Diabetes mellitus or weak immune system (e.g., HIV positive, cancer chemo, splenectomy, organ transplant, chronic steroids)  (Exception: Mild pain that is only present with movement.)    Answer Assessment - Initial Assessment Questions  1. LOCATION: \"Where does it hurt?\" (e.g., left, right)      Right flank pain, not currently painful,  Comes and goes  2. ONSET: \"When did the pain start?\"      cople of days ago  3. SEVERITY: \"How bad is the pain?\" (e.g., Scale 1-10; mild, moderate, or severe)    - MILD (1-3): doesn't interfere with normal activities     - MODERATE (4-7): interferes with normal activities or awakens from sleep     - SEVERE (8-10): excruciating pain and patient unable to do normal activities (stays in bed)        moderate  4. PATTERN: \"Does the " "pain come and go, or is it constant?\"       Comes and goes  5. CAUSE: \"What do you think is causing the pain?\"      Pancreatitis, liver or gall bladder issues,  unknown  6. OTHER SYMPTOMS:  \"Do you have any other symptoms?\" (e.g., fever, abdominal pain, vomiting, leg weakness, burning with urination, blood in urine)      No fever, no abdominal pain or bloating, no vomiting, no blood in urine.  Urine is darker.  Bowel Movement yellow, when asked about consistency \" unsatisfying log\"  That leeches yellow into the toilet and on the tissue, but no blood.  This has been going on.  She was doing some reading and is scared to eat if it is pancreatitis.  Her diet has been, chicken breast, whole grain bread and vegetables.  She has lost weight in the last little bit.  She is staying hydrated.    7. PREGNANCY:  \"Is there any chance you are pregnant?\" \"When was your last menstrual period?\"    Protocols used: FLANK PAIN-A-OH      "

## 2023-07-17 NOTE — PROGRESS NOTES
Progress Note    SUBJECTIVE:  Ines Conte is an 37 year old  , who presents for a routine gynecologic health visit and IUD insertion. Patient is followed by Dr. Tapia for primary care.    Concerns today include:     -Menstrual History: LMP 23. Gets regular periods. No spotting in between cycles.     -Contraception: has tried copper IUD in the past and was happy with this method. Desires to have another IUD placed today. She had been trying to conceive with partner since last September. She is having health issues (PMDD, HTN, high cholesterol, IBS and migraines) and is wanting to pause trying to become pregnant and manage her health prior to conceiving. She does express interest in conceiving in the future, however, and some worry that she has not conceived since September of last year despite UPI. Hx of two miscarriages. Also having some concerns about how her health condition will effect her pregnancy, more specifically her chronic hypertension; she has tried several medications that would be considered safe in pregnancy and not had good control of her BP.   No unprotected intercourse since her LMP.     - Pap smear up to date (2021 NIL, HPV negative)- due .     -Sexually activity- Sexually active with partner. No concerns.     Menstrual History:      2022     6:30 PM 2023     2:40 PM 2023     3:00 PM   Menstrual History   LAST MENSTRUAL PERIOD 2022   Menarche Age  11 years    Period Cycle (Days)  25-30    Period Duration (Days)  4    Period Pattern  Regular    Menstrual Flow  Heavy    Dysmenorrhea  None    Reviewed Today  Yes      Mammogram current: n/a    HISTORY:  Prescription Medications as of 2023       Rx Number Disp Refills Start End Last Dispensed Date Next Fill Date Owning Pharmacy    acetaminophen-caffeine (EXCEDRIN TENSION HEADACHE) 500-65 MG TABS            Sig: Take 2 tablets by mouth every 6 hours as needed for mild pain    Class: Historical     Route: Oral    atenolol-chlorthalidone (TENORETIC) 100-25 MG tablet  90 tablet 3 7/13/2023    [id number] [place] [address]    Sig: Take 1 tablet by mouth daily    Class: E-Prescribe    Notes to Pharmacy: The patient requests that this prescription be held on file for filling in the near future.    Route: Oral    atorvastatin (LIPITOR) 20 MG tablet  180 tablet 0 7/26/2023    [id number] [place] [address]    Sig: Take 2 tablets (40 mg) by mouth daily    Class: E-Prescribe    Route: Oral    Coenzyme Q10 (COQ-10) 400 MG CAPS    8/22/2022        Class: Historical    FLUoxetine (PROZAC) 20 MG capsule  90 capsule 3 11/22/2022    [id number] [place] [address]    Sig: Take 1 capsule (20 mg) by mouth daily    Class: E-Prescribe    Notes to Pharmacy: The patient requests that this prescription be held on file for filling in the near future.    Route: Oral    LORazepam (ATIVAN) 0.5 MG tablet  10 tablet 0 7/12/2023    [facility] [place] [address]    Sig: Take 1 tablet (0.5 mg) by mouth every 6 hours as needed for anxiety (prior to procedure or flight)    Class: E-Prescribe    Route: Oral    losartan (COZAAR) 25 MG tablet  30 tablet 0 7/13/2023    [id number] [place] [address]    Sig: Take 1 tablet (25 mg) by mouth daily    Class: E-Prescribe    Route: Oral    MAGNESIUM CITRATE PO            Class: Historical    Route: Oral    MELATONIN PO            Class: Historical    Route: Oral    misoprostol (CYTOTEC) 200 MCG tablet  2 tablet 0 7/18/2023    [facility] [place] [address]    Sig: Take 2 hours before procedure. Place 1 tab between cheek and gum on the right, and 2nd tab on the left. Dissolve for 30 min, then swallow.    Class: E-Prescribe    Prenatal MV-Min-Fe Fum-FA-DHA (PRENATAL MULTIVITAMIN + DHA PO)    11/22/2019        Class:  Historical    valACYclovir (VALTREX) 500 MG tablet            Si mg as needed    Class: Historical        Allergies   Allergen Reactions     Nifedipine Angioedema, Anxiety, Fatigue, Headache, Muscle Pain (Myalgia), Palpitations, Photosensitivity, Rash and Shortness Of Breath     Immunization History   Administered Date(s) Administered     Influenza,INJ,MDCK,PF,Quad >6mo(Flucelvax) 2018       OB History    Para Term  AB Living   2 0 0 0 2 0   SAB IAB Ectopic Multiple Live Births   2 0 0 0 0     Past Medical History:   Diagnosis Date     Benign essential hypertension      Hyperlipidemia LDL goal <100      Irritable bowel syndrome      PMDD (premenstrual dysphoric disorder)      Past Surgical History:   Procedure Laterality Date     CL AFF SURGICAL PATHOLOGY Bilateral      wisdom teeth       History reviewed. No pertinent family history.  Social History     Socioeconomic History     Marital status:      Spouse name: None     Number of children: None     Years of education: None     Highest education level: None   Tobacco Use     Smoking status: Never     Smokeless tobacco: Never   Vaping Use     Vaping Use: Never used   Substance and Sexual Activity     Alcohol use: Not Currently     Alcohol/week: 4.0 standard drinks of alcohol     Types: 4 Glasses of wine per week     Drug use: Never     Sexual activity: Yes     Partners: Male        ROS: 10 point ROS neg other than the symptoms noted above in the HPI.    EXAM:  Blood pressure 112/75, pulse 68, weight 71.7 kg (158 lb), last menstrual period 2023. Body mass index is 26.29 kg/m .  General: pleasant female in no acute distress  Psych: normal mentation, well oriented  Respiratory:  Unlabored breathing  Musculoskeletal: no gross deformities    BREAST EXAM: declined by patient    PELVIC EXAM:  EG/BUS: Normal genital architecture without lesions, erythema or abnormal secretions; Bartholin's, Urethra, [place]'s normal   Urethral  meatus: normal   Urethra: no masses, tenderness, or scarring   Vagina: moist, pink, rugae with creamy, white and odorless secretions  Cervix: pink, moist, closed, without lesion  Rectum: anus normal     IUD INSERTION PROCEDURE NOTE:   Verification of Procedure:  Just before the procedure begans through verbal and active participation of team members, I verified:     Initials   Patient Name SLD   Patient  SLD   Procedure to be performed SLD     Consent:  Risks, benefits of treatment, and alternative options for contraception were discussed.  Patient's questions were elicited and answered.  Written consent was obtained and scanned into medical record.      ParaGard Insertion    Reason for Insertion:  contraception.    Pregnancy test: Negative    Counseling:  Patient counseled on efficacy, benefits, risks, potential side effects of IUD.  Insertion procedure and risk of infection, perforation, and spontaneous expulsion reviewed. Advised to plan removal and/or replacement of IUD in 10 years from today or when desired.       Ines was pre-medicated with Ativan (pt own supply) prior to beginning the procedure.      Under sterile technique, cervix was visualized with a medium Devin speculum and prepped with Betadine solution. A tenaculum was placed at the 12 o'clock position. A sound was used to sound the uterus, but resistance was met at the internal os. The tenaculum was moved to the 6 o'clock position, and and an os finder used to attempt to sound the uterus. Cervical stenosis noted.  Patient expressed discomfort. Procedure was discontinued per patient request.     EBL:  Minimal     Complications: unable to place IUD due to cervical stenosis      Ines Conte tolerated procedure well.    ASSESSMENT:  Encounter Diagnoses   Name Primary?     Encounter for IUD insertion      Encounter for gynecological examination without abnormal finding Yes        PLAN:   - Unable to insert the copper IUD today due to cervical  stenosis.   - Counseled pt on options; she desires to take cytotec prior to procedure and have IUD placement attempted in clinic.  Rx placed. She is schedule appointment.    - Counseled on option to complete fertility evaluation at anytime given that she has been trying to conceive for >6 months and is >35 yrs of age.  Also offered MFM preconception consult regarding chronic health conditions in pregnancy at anytime. Patient will consider these in the future.   - Pap smear due 2026  - Mammogram to start at age 41     Verbalized understanding and agreement with visit plan.     Noemí Kidd, DNP, APRN, WHNP

## 2023-07-17 NOTE — PROGRESS NOTES
"Dear patient. Thank you for visiting with me. I want you to feel respected, understood, and empowered. \"Respect\" is valuing you as much as I would a close family member. \"Empowerment\" happens when you are fully informed, and can make the best possible decision for you.  Please ask me questions!  Challenge anything that is not clear.    Medical records are primarily used as memory aids for me and my colleagues. Things to know about my documentation style:  - The 'problem list' includes current symptoms or diagnoses, and some problems that are resolved but may return. I use the past medical history for problems not expected to return.  - I use single quotation marks for things that you or I said, when I want to clarify who was speaking.  - I use double quotation marks when copying a term from elsewhere in your records. Italics (besides here) may also denote a quotation.  If you have questions or concerns, please contact me; I will reply as soon as time allows.    Subjective     Ines Conte is a 37 year old woman, with concerns including:  Chief Complaint   Patient presents with     Recheck Medication     Seen a few weeks ago for bp and had bloodwork done - found high blood sugar     stool     Noticed yellow color in stool, concerned of pancreatitis     Pain     Would like to discuss lower back pain     PCP: Thomas Tapia   Visit type: problem-oriented          Stool discoloration:  - Last two weeks, has had increased yellow discoloration of stool, denies silver or gray stools, usually 4 and sometimes 5 on bristol score    - 1/3 of the way to yellow to brown is the image she endorsed as the coloration  - Endorses intermittent right CVA pain   - Denies ingesting lots of greasy foods or having any link to stool color change       Impaired fasting glucose/prediabetes:  - Previous testing done was fasting in the afternoon, reports only having dinner night before  - Explained impaired fasting glucose/prediabetes   - FHx " of DM   - Patient expressed fear of dietary restrictions, has appointment with nutritionist in  September      IBS:   - Endorses chicory root, cherimoya, cherries causes flares   - Denies sensitivity to 5 foods on the high fructose list.      - Patient will send in vaccination records via [employer]     - Cholesterol remains borderline, patient endorses a long hx of elevated cholesterol   - Denies any chance of being pregnant or unhealthy diet, endorses regular periods       Objective     BP (!) 131/92 (BP Location: Right arm, Patient Position: Sitting, Cuff Size: Adult Regular)   Pulse 73   Wt 69.9 kg (154 lb)   SpO2 97%   BMI 25.63 kg/m      Physical Exam  Abdominal:      Comments:   - Bowel sounds are high pitched and loud   - Medium sized stool mass ULQ, not painful   - Tenderness with pressure just above umbilicus   - Bowel sounds sharply increased after exam   - Leg lift - no abdominal pain             - Fasting blood glucose of 102 with last labs     Assessment & Plan       Stool discoloration and flank pain  Explained about low likelihood of liver or pancreas problems, but will check labs.        Impaired fasting glucose/prediabetes:  - DDX impaired fasting glucose/prediabetes   - Monitor diet/food quality, increase activity, and find balance doing so  - Patient will schedule follow up if she is struggling to strike a healthy balance       IBS:   - Patient admits to being an anxious person. One mechanism of IBS is physical manifestation contributes to IBS flares. Also dietary or fluid variation can be a problem.  - Monitor anxiety and avoid dietary triggers, stay hydrated and keep up fiber, follow-up with PCP for more discussion and possible evaluation.      About this visit:  Time note (e4, 30'): The total time (on the date of service) for this service was 30 minutes, including discussion/face-to-face, chart review, interpretation not otherwise reported, documentation, and updating of the computerized  record.    Scribe Disclosure:   I, Eddie Vital, am serving as a scribe; to document services personally performed by Dr. Saúl West- -based on data collection and the provider's statements to me.     Provider Disclosure:  I agree with above History, Review of Systems, Physical exam and Plan.  I have reviewed the content of the documentation and have edited it as needed. I have personally performed the services documented here and the documentation accurately represents those services and the decisions I have made.      Electronically signed by:  Dr. Saúl West

## 2023-07-18 ENCOUNTER — OFFICE VISIT (OUTPATIENT)
Dept: OBGYN | Facility: CLINIC | Age: 38
End: 2023-07-18
Attending: NURSE PRACTITIONER
Payer: COMMERCIAL

## 2023-07-18 VITALS
WEIGHT: 158 LBS | DIASTOLIC BLOOD PRESSURE: 75 MMHG | BODY MASS INDEX: 26.29 KG/M2 | HEART RATE: 68 BPM | SYSTOLIC BLOOD PRESSURE: 112 MMHG

## 2023-07-18 DIAGNOSIS — Z30.430 ENCOUNTER FOR IUD INSERTION: ICD-10-CM

## 2023-07-18 DIAGNOSIS — Z01.419 ENCOUNTER FOR GYNECOLOGICAL EXAMINATION WITHOUT ABNORMAL FINDING: Primary | ICD-10-CM

## 2023-07-18 LAB
CHLORIDE UR-SCNC: <20 MMOL/L
CREAT UR-MCNC: 52.2 MG/DL
HCG UR QL: NEGATIVE
HCV AB SERPL QL IA: NONREACTIVE
INTERNAL QC OK POCT: NORMAL
POCT KIT EXPIRATION DATE: NORMAL
POCT KIT LOT NUMBER: NORMAL
SODIUM UR-SCNC: <20 MMOL/L
TSH SERPL DL<=0.005 MIU/L-ACNC: 1.04 UIU/ML (ref 0.3–4.2)

## 2023-07-18 PROCEDURE — 99213 OFFICE O/P EST LOW 20 MIN: CPT | Mod: 25 | Performed by: NURSE PRACTITIONER

## 2023-07-18 PROCEDURE — G0463 HOSPITAL OUTPT CLINIC VISIT: HCPCS | Performed by: NURSE PRACTITIONER

## 2023-07-18 PROCEDURE — 58300 INSERT INTRAUTERINE DEVICE: CPT | Performed by: NURSE PRACTITIONER

## 2023-07-18 PROCEDURE — 81025 URINE PREGNANCY TEST: CPT | Performed by: NURSE PRACTITIONER

## 2023-07-18 RX ORDER — MISOPROSTOL 200 UG/1
TABLET ORAL
Qty: 2 TABLET | Refills: 0 | Status: SHIPPED | OUTPATIENT
Start: 2023-07-18 | End: 2024-02-13

## 2023-07-18 ASSESSMENT — PAIN SCALES - GENERAL: PAINLEVEL: NO PAIN (0)

## 2023-07-18 NOTE — PATIENT INSTRUCTIONS
Thank you for trusting us with your care!     If you need to contact us for questions about:  Symptoms, Scheduling & Medical Questions; Non-urgent (2-3 day response) Vicky message, Urgent (needing response today) [phone] (if after 3:30pm next day response)   Prescriptions: Please call your Pharmacy   Billing: Diane [phone] or [facility]:[phone]

## 2023-07-18 NOTE — TELEPHONE ENCOUNTER
Patient had an appt with Dr. West on 7/17/23, mychart questions were addressed at that visit.     Alida Edwards RN on 7/18/2023 at 4:22 PM

## 2023-07-18 NOTE — LETTER
2023       RE: Ines Conte  [address]  [address]  [facility] [address]     Dear Colleague,    Thank you for referring your patient, Ines Conte, to the [facility] [place] at [facility]. Please see a copy of my visit note below.    Progress Note    SUBJECTIVE:  Ines Conte is an 37 year old  , who presents for a routine gynecologic health visit and IUD insertion. Patient is followed by Dr. Tapia for primary care.    Concerns today include:     -Menstrual History: LMP 23. Gets regular periods. No spotting in between cycles.     -Contraception: has tried copper IUD in the past and was happy with this method. Desires to have another IUD placed today. She had been trying to conceive with partner since last September. She is having health issues (PMDD, HTN, high cholesterol, IBS and migraines) and is wanting to pause trying to become pregnant and manage her health prior to conceiving. She does express interest in conceiving in the future, however, and some worry that she has not conceived since September of last year despite UPI. Hx of two miscarriages. Also having some concerns about how her health condition will effect her pregnancy, more specifically her chronic hypertension; she has tried several medications that would be considered safe in pregnancy and not had good control of her BP.   No unprotected intercourse since her LMP.     - Pap smear up to date (2021 NIL, HPV negative)- due .     -Sexually activity- Sexually active with partner. No concerns.     Menstrual History:      2022     6:30 PM 2023     2:40 PM 2023     3:00 PM   Menstrual History   LAST MENSTRUAL PERIOD 2022   Menarche Age  11 years    Period Cycle (Days)  25-30    Period Duration (Days)  4    Period Pattern  Regular    Menstrual Flow  Heavy    Dysmenorrhea  None    Reviewed Today  Yes      Mammogram  current: n/a    HISTORY:  Prescription Medications as of 7/26/2023         Rx Number Disp Refills Start End Last Dispensed Date Next Fill Date Owning Pharmacy    acetaminophen-caffeine (EXCEDRIN TENSION HEADACHE) 500-65 MG TABS            Sig: Take 2 tablets by mouth every 6 hours as needed for mild pain    Class: Historical    Route: Oral    atenolol-chlorthalidone (TENORETIC) 100-25 MG tablet  90 tablet 3 7/13/2023    [id number] [place] [address] [address]    Sig: Take 1 tablet by mouth daily    Class: E-Prescribe    Notes to Pharmacy: The patient requests that this prescription be held on file for filling in the near future.    Route: Oral    atorvastatin (LIPITOR) 20 MG tablet  180 tablet 0 7/26/2023    [facility] [address] [place] [address] [address]    Sig: Take 2 tablets (40 mg) by mouth daily    Class: E-Prescribe    Route: Oral    Coenzyme Q10 (COQ-10) 400 MG CAPS    8/22/2022        Class: Historical    FLUoxetine (PROZAC) 20 MG capsule  90 capsule 3 11/22/2022    [facility] [address] [place] [address] [address]    Sig: Take 1 capsule (20 mg) by mouth daily    Class: E-Prescribe    Notes to Pharmacy: The patient requests that this prescription be held on file for filling in the near future.    Route: Oral    LORazepam (ATIVAN) 0.5 MG tablet  10 tablet 0 7/12/2023    [id number] [place] [address] [address]    Sig: Take 1 tablet (0.5 mg) by mouth every 6 hours as needed for anxiety (prior to procedure or flight)    Class: E-Prescribe    Route: Oral    losartan (COZAAR) 25 MG tablet  30 tablet 0 7/13/2023    [id number] [place] [address] [address]    Sig: Take 1 tablet (25 mg) by mouth daily    Class: E-Prescribe    Route: Oral    MAGNESIUM CITRATE PO            Class: Historical    Route: Oral    MELATONIN PO            Class: Historical    Route: Oral    misoprostol (CYTOTEC) 200 MCG tablet  2 tablet 0  2023    [facility] - [place], [place] - [address]    Sig: Take 2 hours before procedure. Place 1 tab between cheek and gum on the right, and 2nd tab on the left. Dissolve for 30 min, then swallow.    Class: E-Prescribe    Prenatal MV-Min-Fe Fum-FA-DHA (PRENATAL MULTIVITAMIN + DHA PO)    2019        Class: Historical    valACYclovir (VALTREX) 500 MG tablet            Si mg as needed    Class: Historical          Allergies   Allergen Reactions    Nifedipine Angioedema, Anxiety, Fatigue, Headache, Muscle Pain (Myalgia), Palpitations, Photosensitivity, Rash and Shortness Of Breath     Immunization History   Administered Date(s) Administered    Influenza,INJ,MDCK,PF,Quad >6mo(Flucelvax) 2018       OB History    Para Term  AB Living   2 0 0 0 2 0   SAB IAB Ectopic Multiple Live Births   2 0 0 0 0     Past Medical History:   Diagnosis Date    Benign essential hypertension     Hyperlipidemia LDL goal <100     Irritable bowel syndrome     PMDD (premenstrual dysphoric disorder)      Past Surgical History:   Procedure Laterality Date    CL AFF SURGICAL PATHOLOGY Bilateral 2003    wisdom teeth       History reviewed. No pertinent family history.  Social History     Socioeconomic History    Marital status:      Spouse name: None    Number of children: None    Years of education: None    Highest education level: None   Tobacco Use    Smoking status: Never    Smokeless tobacco: Never   Vaping Use    Vaping Use: Never used   Substance and Sexual Activity    Alcohol use: Not Currently     Alcohol/week: 4.0 standard drinks of alcohol     Types: 4 Glasses of wine per week    Drug use: Never    Sexual activity: Yes     Partners: Male        ROS: 10 point ROS neg other than the symptoms noted above in the HPI.    EXAM:  Blood pressure 112/75, pulse 68, weight 71.7 kg (158 lb), last menstrual period 2023. Body mass index is 26.29 kg/m .  General: pleasant female  in no acute distress  Psych: normal mentation, well oriented  Respiratory:  Unlabored breathing  Musculoskeletal: no gross deformities    BREAST EXAM: declined by patient    PELVIC EXAM:  EG/BUS: Normal genital architecture without lesions, erythema or abnormal secretions; Bartholin's, Urethra, [facility]'s normal   Urethral meatus: normal   Urethra: no masses, tenderness, or scarring   Vagina: moist, pink, rugae with creamy, white and odorless secretions  Cervix: pink, moist, closed, without lesion  Rectum: anus normal     IUD INSERTION PROCEDURE NOTE:   Verification of Procedure:  Just before the procedure begans through verbal and active participation of team members, I verified:     Initials   Patient Name SLD   Patient  SLD   Procedure to be performed SLD     Consent:  Risks, benefits of treatment, and alternative options for contraception were discussed.  Patient's questions were elicited and answered.  Written consent was obtained and scanned into medical record.      ParaGard Insertion    Reason for Insertion:  contraception.    Pregnancy test: Negative    Counseling:  Patient counseled on efficacy, benefits, risks, potential side effects of IUD.  Insertion procedure and risk of infection, perforation, and spontaneous expulsion reviewed. Advised to plan removal and/or replacement of IUD in 10 years from today or when desired.       Ines was pre-medicated with Ativan (pt own supply) prior to beginning the procedure.      Under sterile technique, cervix was visualized with a medium Devin speculum and prepped with Betadine solution. A tenaculum was placed at the 12 o'clock position. A sound was used to sound the uterus, but resistance was met at the internal os. The tenaculum was moved to the 6 o'clock position, and and an os finder used to attempt to sound the uterus. Cervical stenosis noted.  Patient expressed discomfort. Procedure was discontinued per patient request.     EBL:  Minimal     Complications:  unable to place IUD due to cervical stenosis      Ines Conte tolerated procedure well.    ASSESSMENT:  Encounter Diagnoses   Name Primary?    Encounter for IUD insertion     Encounter for gynecological examination without abnormal finding Yes        PLAN:   - Unable to insert the copper IUD today due to cervical stenosis.   - Counseled pt on options; she desires to take cytotec prior to procedure and have IUD placement attempted in clinic.  Rx placed. She is schedule appointment.    - Counseled on option to complete fertility evaluation at anytime given that she has been trying to conceive for >6 months and is >35 yrs of age.  Also offered MFM preconception consult regarding chronic health conditions in pregnancy at anytime. Patient will consider these in the future.   - Pap smear due 2026  - Mammogram to start at age 41     Verbalized understanding and agreement with visit plan.     Noemí Kidd, DNP, APRN, WHNP

## 2023-07-23 DIAGNOSIS — E78.00 HYPERCHOLESTEREMIA: ICD-10-CM

## 2023-07-26 DIAGNOSIS — I10 PRIMARY HYPERTENSION: ICD-10-CM

## 2023-07-26 RX ORDER — ATORVASTATIN CALCIUM 20 MG/1
40 TABLET, FILM COATED ORAL DAILY
Qty: 180 TABLET | Refills: 0 | Status: SHIPPED | OUTPATIENT
Start: 2023-07-26 | End: 2024-04-08

## 2023-07-26 NOTE — TELEPHONE ENCOUNTER
ATORVASTATIN 20 MG TABLET      Last Written Prescription Date:  7/3/23  Last Fill Quantity: 30,   # refills: 3  Last Office Visit : 7/17/23  Future Office visit:  7/28/23    Pharmacy request to change to 90 day supply. Refill change sent, pt has next appt 7/28/23.

## 2023-07-28 ENCOUNTER — LAB (OUTPATIENT)
Dept: LAB | Facility: CLINIC | Age: 38
End: 2023-07-28
Payer: COMMERCIAL

## 2023-07-28 ENCOUNTER — ALLIED HEALTH/NURSE VISIT (OUTPATIENT)
Dept: INTERNAL MEDICINE | Facility: CLINIC | Age: 38
End: 2023-07-28
Payer: COMMERCIAL

## 2023-07-28 DIAGNOSIS — Z01.30 BLOOD PRESSURE CHECK: Primary | ICD-10-CM

## 2023-07-28 DIAGNOSIS — R79.89 ABNORMAL TSH: ICD-10-CM

## 2023-07-28 DIAGNOSIS — I10 PRIMARY HYPERTENSION: ICD-10-CM

## 2023-07-28 LAB
ANION GAP SERPL CALCULATED.3IONS-SCNC: 10 MMOL/L (ref 7–15)
BUN SERPL-MCNC: 13.6 MG/DL (ref 6–20)
CALCIUM SERPL-MCNC: 9.4 MG/DL (ref 8.6–10)
CHLORIDE SERPL-SCNC: 98 MMOL/L (ref 98–107)
CREAT SERPL-MCNC: 0.87 MG/DL (ref 0.51–0.95)
DEPRECATED HCO3 PLAS-SCNC: 30 MMOL/L (ref 22–29)
GFR SERPL CREATININE-BSD FRML MDRD: 88 ML/MIN/1.73M2
GLUCOSE SERPL-MCNC: 96 MG/DL (ref 70–99)
POTASSIUM SERPL-SCNC: 3.4 MMOL/L (ref 3.4–5.3)
SODIUM SERPL-SCNC: 138 MMOL/L (ref 136–145)
TSH SERPL DL<=0.005 MIU/L-ACNC: 0.97 UIU/ML (ref 0.3–4.2)

## 2023-07-28 PROCEDURE — 80048 BASIC METABOLIC PNL TOTAL CA: CPT | Performed by: PATHOLOGY

## 2023-07-28 PROCEDURE — 36415 COLL VENOUS BLD VENIPUNCTURE: CPT | Performed by: PATHOLOGY

## 2023-07-28 PROCEDURE — 99207 PR NO CHARGE NURSE ONLY: CPT

## 2023-07-28 PROCEDURE — 84443 ASSAY THYROID STIM HORMONE: CPT | Performed by: PATHOLOGY

## 2023-07-28 RX ORDER — LOSARTAN POTASSIUM 25 MG/1
25 TABLET ORAL DAILY
Qty: 90 TABLET | Refills: 3 | Status: SHIPPED | OUTPATIENT
Start: 2023-07-28 | End: 2024-06-19

## 2023-07-28 NOTE — PROGRESS NOTES
Triple Blood Pressure Check Visit    Ines Conte presents in clinic today for blood pressure monitoring. The patient was greeted and escorted back to the room without incident. The patient's arm was elevated to heart level and they were instructed to uncross their legs. A triple blood pressure AHA was preformed wherein the blood pressure machine took Ines Conte's blood pressure over the course of ten minutes. The following were the individual blood pressures that were observed at today's visit:    (1) 115/83     (2) 116/83    (3) 119/86    The average blood pressure from today's individual readings were 117/84. The results of today's visit were communicated to the ordering provider.    ELVER Schneider   2:36 PM 7/28/2023

## 2023-07-30 RX ORDER — LOSARTAN POTASSIUM 25 MG/1
TABLET ORAL
Qty: 30 TABLET | Refills: 0 | OUTPATIENT
Start: 2023-07-30

## 2023-08-07 NOTE — PROGRESS NOTES
"IUD Insertion:  CONSULT:    Is a pregnancy test required: Yes.  Was it positive or negative?  Negative  Was a consent obtained?  Yes    Subjective: Ines Conte is a 37 year old  presents for IUD and desires Paragard type IUD.  IUD insertion was attempted on  but discontinued due to complication of cervical stenosis. She has taken cytotec prior to procedure today.     Patient has been given the opportunity to ask questions about all forms of birth control, including all options appropriate for Ines Conte. Discussed that no method of birth control, except abstinence is 100% effective against pregnancy or sexually transmitted infection.     Ines Conte understands she may have the IUD removed at any time. IUD should be removed by a health care provider.    The entire insertion procedure was reviewed with the patient, including care after placement.    Patient's LMP was . No unprotected intercourse since her LMP.  No allergy to betadine or shellfish. Declines STD testing.     HCG Qual Urine   Date Value Ref Range Status   2023 Negative Negative Final     /89   Pulse 51   Ht 1.651 m (5' 5\")   Wt 69.7 kg (153 lb 11.2 oz)   LMP 2023   BMI 25.58 kg/m      Pelvic Exam:   EG/BUS: normal genital architecture without lesions, erythema or abnormal secretions.   Vagina: moist, pink, rugae with physiologic discharge and secretions  Cervix: nulliparous, no lesions and pink, moist, closed, without lesion   Uterus: anteverted position    PROCEDURE NOTE: -- IUD Insertion    Reason for Insertion: contraception    Premedicated with ibuprofen. (Pt own supply).   Under sterile technique, cervix was visualized with a medium edward speculum and prepped with Betadine solution swab x 3. Tenaculum was placed for stability. The uterus was gently straightened. Sounding was attempted with plastic and then metal sound but cervical stenosis noted.  Os finder was also used to attempt to sound the " uterus, without success. Kristen Brooks Carlota ACHARYAM was called into the room. She repeated these attempts without success.  IUD placement was discontinued due to meeting resistance at the internal cervical os and unable to pass the sound to the fundus. Tenaculum was removed and hemostasis noted. Speculum removed.  Patient tolerated procedure well.    EBL: minimal    Complications: none    ASSESSMENT:     ICD-10-CM    1. Encounter for IUD insertion  Z30.430 hCG qual urine POCT           PLAN:  IUD was unable to be placed today due to cervical stenosis.   Counseled pt on options including 1) having IUD placed under ultrasound guidance w/ cytotec or during menses with/without cervical block  2) having IUD placed with anesthesia in the OR or 3) switching to alternative contraceptive method. Patient had been offered a paracervical block to be used today during the procedure but she declined.  Ines will check with her insurance regarding coverage of the IUD insertion under anesthesia.  Counseled on alternative contraceptive options that are safe with HTN and which would be helpful for her PMDD (currently managed with selective serotonin reuptake inhibitor).  Ines desires to consider Nexplanon placement.  Handout given.      Ines left, stable. She expressed understanding and agreement with the plan for care.    Noemí Kidd, DNP, APRN, WHNP

## 2023-08-08 ENCOUNTER — OFFICE VISIT (OUTPATIENT)
Dept: OBGYN | Facility: CLINIC | Age: 38
End: 2023-08-08
Attending: NURSE PRACTITIONER
Payer: COMMERCIAL

## 2023-08-08 VITALS
SYSTOLIC BLOOD PRESSURE: 134 MMHG | HEIGHT: 65 IN | WEIGHT: 153.7 LBS | BODY MASS INDEX: 25.61 KG/M2 | HEART RATE: 51 BPM | DIASTOLIC BLOOD PRESSURE: 89 MMHG

## 2023-08-08 DIAGNOSIS — Z30.430 ENCOUNTER FOR IUD INSERTION: Primary | ICD-10-CM

## 2023-08-08 LAB
HCG UR QL: NEGATIVE
INTERNAL QC OK POCT: NORMAL
POCT KIT EXPIRATION DATE: NORMAL
POCT KIT LOT NUMBER: NORMAL

## 2023-08-08 PROCEDURE — 81025 URINE PREGNANCY TEST: CPT | Performed by: NURSE PRACTITIONER

## 2023-08-08 PROCEDURE — 58300 INSERT INTRAUTERINE DEVICE: CPT | Mod: 52 | Performed by: NURSE PRACTITIONER

## 2023-08-08 RX ORDER — COPPER 313.4 MG/1
1 INTRAUTERINE DEVICE INTRAUTERINE ONCE
Status: CANCELLED
Start: 2023-08-08 | End: 2023-08-08

## 2023-08-08 NOTE — PATIENT INSTRUCTIONS
Thank you for trusting us with your care!     If you need to contact us for questions about:  Symptoms, Scheduling & Medical Questions; Non-urgent (2-3 day response) Vicky message, Urgent (needing response today) [phone] (if after 3:30pm next day response)   Prescriptions: Please call your Pharmacy   Billing: Diane [phone] or [facility]:[phone]

## 2023-08-08 NOTE — LETTER
"2023       RE: Ines Conte  [address]  [address]  [facility] [address]     Dear Colleague,    Thank you for referring your patient, Ines Conte, to the [facility] [place] at [facility]. Please see a copy of my visit note below.    IUD Insertion:  CONSULT:    Is a pregnancy test required: Yes.  Was it positive or negative?  Negative  Was a consent obtained?  Yes    Subjective: Ines Conte is a 37 year old  presents for IUD and desires Paragard type IUD.  IUD insertion was attempted on  but discontinued due to complication of cervical stenosis. She has taken cytotec prior to procedure today.     Patient has been given the opportunity to ask questions about all forms of birth control, including all options appropriate for Ines Conte. Discussed that no method of birth control, except abstinence is 100% effective against pregnancy or sexually transmitted infection.     Ines Conte understands she may have the IUD removed at any time. IUD should be removed by a health care provider.    The entire insertion procedure was reviewed with the patient, including care after placement.    Patient's LMP was . No unprotected intercourse since her LMP.  No allergy to betadine or shellfish. Declines STD testing.     HCG Qual Urine   Date Value Ref Range Status   2023 Negative Negative Final     /89   Pulse 51   Ht 1.651 m (5' 5\")   Wt 69.7 kg (153 lb 11.2 oz)   LMP 2023   BMI 25.58 kg/m      Pelvic Exam:   EG/BUS: normal genital architecture without lesions, erythema or abnormal secretions.   Vagina: moist, pink, rugae with physiologic discharge and secretions  Cervix: nulliparous, no lesions and pink, moist, closed, without lesion   Uterus: anteverted position    PROCEDURE NOTE: -- IUD Insertion    Reason for Insertion: contraception    Premedicated with ibuprofen. (Pt own supply).   Under " sterile technique, cervix was visualized with a medium edward speculum and prepped with Betadine solution swab x 3. Tenaculum was placed for stability. The uterus was gently straightened. Sounding was attempted with plastic and then metal sound but cervical stenosis noted.  Os finder was also used to attempt to sound the uterus, without success. Kristen Brooks Carlota CNM was called into the room. She repeated these attempts without success.  IUD placement was discontinued due to meeting resistance at the internal cervical os and unable to pass the sound to the fundus. Tenaculum was removed and hemostasis noted. Speculum removed.  Patient tolerated procedure well.    EBL: minimal    Complications: none    ASSESSMENT:     ICD-10-CM    1. Encounter for IUD insertion  Z30.430 hCG qual urine POCT           PLAN:  IUD was unable to be placed today due to cervical stenosis.   Counseled pt on options including 1) having IUD placed under ultrasound guidance w/ cytotec or during menses with/without cervical block  2) having IUD placed with anesthesia in the OR or 3) switching to alternative contraceptive method. Patient had been offered a paracervical block to be used today during the procedure but she declined.  Ines will check with her insurance regarding coverage of the IUD insertion under anesthesia.  Counseled on alternative contraceptive options that are safe with HTN and which would be helpful for her PMDD (currently managed with selective serotonin reuptake inhibitor).  Ines desires to consider Nexplanon placement.  Handout given.      Ines left, stable. She expressed understanding and agreement with the plan for care.    Noemí Kidd, DNP, APRN, WHNP

## 2023-08-13 ENCOUNTER — HEALTH MAINTENANCE LETTER (OUTPATIENT)
Age: 38
End: 2023-08-13

## 2023-09-06 ENCOUNTER — HOSPITAL ENCOUNTER (OUTPATIENT)
Dept: NUTRITION | Facility: CLINIC | Age: 38
Discharge: HOME | End: 2023-09-06
Attending: HOSPITALIST | Admitting: HOSPITALIST
Payer: COMMERCIAL

## 2023-09-06 PROCEDURE — 97802 MEDICAL NUTRITION INDIV IN: CPT | Mod: GT,95 | Performed by: DIETITIAN, REGISTERED

## 2023-09-06 NOTE — PROGRESS NOTES
Virtual Visit Details    Type of service:  Video Visit     Originating Location (pt. Location): Home    Distant Location (provider location):  On-site  Platform used for Video Visit: [facility]     OUTPATIENT NUTRITION ASSESSMENT   REASON FOR ASSESSMENT  Ines Conte referred by Dr. Lopez for MNT related to Hypercholesteremia [E78.00] .    Patient accompanied by self      ASSESSMENT   Nutrition History:  - Information obtained from patient.  - Patient is on a low sodium diet at home.  Patient has struggled with food for years due to food allergies and intolerances.  After college patient's throat would close after eating most foods so patient became fearful of eating foods due to the reaction. Allergies -wheat, dairy, tree nuts, peanuts, soy.  Patient gradually started adding foods back into diet and is able to tolerate now.  Patient states mainly ate white rice, boiled chicken and peeled pears during that time.      Stress out foods-  At age 10 years old, patient decided not to eat meat unless for medical reason.  Patient grew up in [place] and recently moved to [place] in the past year with her .  Patient's  supportive of patient's food needs.    Ovo lacto vegetarian until age 22 (not for weight loss)   After college -stopped being vegetarian due to health.  Patient has challenge with keeping weight on but wouldn't allow self to eat processed foods so started adding back food intolerance foods. By 2014, patient states added all foods back into diet.    Patient states has been told for years by physicians that needs to lose 10 lbs and avoid fried foods which patient does not consume on regular basis.     Patient with elevated blood pressure since age 27-27. At that time patient was training for 1/2 marathon and still eating well.     Patient sleeps 8 hours per night and works 8 hours max per day.     Diet Recall:  Breakfast: avocado toast or fried egg and toast or kefir with homemade granola or yogurt with  "fruit or oatmeal with frozen berries (7:30-8:00)    Lunch: bagged salad for lunch-baked chicken breasts added to salad (1/2 chicken breast) full packet on salad fixings but not all of dressing (1:00)    Dinner: describes grazing-  patient uses small ice cream bowls for meals -vegetables (stir watt) sauce from scratch or sheet pan chicken -turnips, brussel sprouts or Mediterranean bowl -roasted vegetables, brown rice/farro/quinoa tahini yogurt sauce with hummus chicken breasts   Snack: popcorn (homemade) garlic powder + pepper or nutritional yeast   Beverages: 1 cup coffee in AM with soy milk or coconut milk or coffee mate (limits); water; sparklig water Ramon; 1 time per week diet caffeine free Coke -stopped drinking ETOH in past 2 months   Dining out: fun date night; emergency dining out or no food left in home      Exercise: walking during the weekend      NUTRITION FOCUSED PHYSICAL ASSESSMENT (NFPA) FOR DIAGNOSING MALNUTRITION  Yes         Observed:   No nutrition-related physical findings observed    Obtained from Chart/Interdisciplinary Team:  None noted     LABS  Labs reviewed    MEDICATIONS  Medications reviewed    ANTHROPOMETRICS   Height: 5'5\"  Weight: 153 lbs   BMI (kg/m2): 25.4 kg/m2   Weight Status:  Overweight BMI 25-29.9  %IBW: 122%  Weight History: Patient with 10% weight loss in 9 months.   Wt Readings from Last 10 Encounters:   08/08/23 69.7 kg (153 lb 11.2 oz)   07/18/23 71.7 kg (158 lb)   07/17/23 69.9 kg (154 lb)   06/29/23 75.2 kg (165 lb 12.8 oz)   11/22/22 76.9 kg (169 lb 8 oz)      ASSESSED NUTRITION NEEDS  Estimated Energy Needs: 3208-1631 kcals/day (20-25 Kcal/Kg)  Justification:  (maintenance)  Estimated Protein Needs: 57-68 grams protein/day (1-1.2 g pro/Kg)  Justification:  (preservation of lean body mass)  Estimated Fluid Needs: 5176-2080 mL/day (30-35 mL/kg)    ASSESSED MALNUTRITION STATUS  % Weight Loss:  Weight loss does not meet criteria for malnutrition (10% weight loss in 9 " months)  % Intake:  Decreased intake does not meet criteria for malnutrition   Subcutaneous Fat Loss:  None observed  Loss of Muscle Mass:  None observed  Fluid Retention:  None noted    Malnutrition Diagnosis:  Patient does not meet two of the above criteria necessary for diagnosing malnutrition    DIAGNOSIS   Nutrition Diagnosis:  Food and nutrition-related knowledge deficit related to lack of prior exposure due to not meeting with dietitian in past as evidenced by no previous need for food and nutrition recommendations       INTERVENTIONS   Nutrition Prescription   Recommend balanced, modified carbohydrate diet for hypertension and hyperlipidemia       IMPLEMENTATION   Assessed learning needs and learning preference  Teaching Method(s) used: Booklet / Handout  Explanation  Vitamin and Mineral Supplements: recommend complete multivitamin and mineral   Diet Education:  Provided education on balanced carbohydrate, protein and modified fat diet   Nutrition Education (Content):   a)  Discussed current eating behaviors.  Reviewed lab values with patient.  Patient states has had hypercholesterolemia and hypertension for years.  Discussed balanced meals with protein, carbohydrates and fat.  Suggest 30-45 grams carbohydrate per meal as patient was not including carbohydrates at lunch.  Reviewed normal HgbA1C results from chart.  Explained if patient would have diabetes, carbohydrates would still be included into the diet.  Discussed DASH diet and encouraged patient to include 5-7 servings of fruit and vegetables along with low fat dairy, whole grains and legumes in diet to help with hypertension.  Suggest adding 2-3 T olive oil per day, ground flaxseed and 1 oz (walnuts, almonds and hazelnuts) daily to help with lipid lowering.  Encouraged patient to include all macronutrients at each meal. Discussed exercise 4 times per week to help wih overall health.  Suggest gradual diet and behavior changes for long term success with  medical issues and sustainability.  Supported patient with the challenge of diet changes with past history of health concerns.       b)  Provided My Plate; Protein Sources for Weight Loss  Nutrition Education (Application):   a)  Discussed current eating plans and offered meals options.    b)  Patient verbalizes understanding of diet by adding protein and carbohydrate sources at meals.    Expected patient engagement: good     GOALS  Consume 2-3 T olive oil daily  Include 1 oz nuts (walnuts, almonds and hazelnuts) daily  Aim for 30-45 grams carbohydrates per meal  Aim for movement 4 times per week     FOLLOW UP/MONITORING   Progress towards goals will be monitored and evaluated per protocol and Practice Guidelines  Patient to call for follow up appointment in 4-6 weeks  RD name and number provided     Time Spent with Patient  52 minutes     Navdeep Weston, RD, LD  [facility] Outpatient Dietitian  [phone] (office phone)

## 2023-09-06 NOTE — DISCHARGE INSTRUCTIONS
Shakeel Chacon,    It was great meeting you today!  Welcome to [place].  I'm glad to hear you're enjoying it!      Here are some of our talking points from today:    Consume 2-3 T olive oil daily (add to salad, roasted vegetables, avocado toast, etc)   Include 1 oz nuts (walnuts, almonds and hazelnuts) daily  Aim for 30-45 grams carbohydrates per meal  Aim for movement 4 times per week (possibly 2 days during the week and 2 days on the weekend)  Add protein source at each meals (beans, nuts, cheese, low sodium cottage cheese)     Quick meal ideas:  Egg scramble (eggs and or egg whites with fresh vegetables -any that you like) + sweet potatoes/roasted vegetable/whole grain toast + fruit   Chicken caesar wrap (whole grain tortilla with your prepared chicken, baldemar greens, tomatoes and caesar salad dressing) + fruit   Add garbanzo/black beans to salad at lunch + 1 oz cheese   Add protein source when having oatmeal -walnuts or hard boiled egg (could use whites)    Carbohydrate and protein sources listed below:    My Plate   [email or web address]    Protein Sources   [email or web address]       Please call me to schedule a follow up appointment in 4-6 weeks.  I want to help support you with these changes.    Take tran,    Navdeep Milton, RD, LD  [facility] Outpatient Dietitian  [phone] (office phone)

## 2023-10-22 ENCOUNTER — HEALTH MAINTENANCE LETTER (OUTPATIENT)
Age: 38
End: 2023-10-22

## 2024-01-03 ENCOUNTER — TELEPHONE (OUTPATIENT)
Dept: INTERNAL MEDICINE | Facility: CLINIC | Age: 39
End: 2024-01-03
Payer: COMMERCIAL

## 2024-01-09 ENCOUNTER — TELEPHONE (OUTPATIENT)
Dept: INTERNAL MEDICINE | Facility: CLINIC | Age: 39
End: 2024-01-09
Payer: COMMERCIAL

## 2024-03-10 ENCOUNTER — HEALTH MAINTENANCE LETTER (OUTPATIENT)
Age: 39
End: 2024-03-10

## 2024-04-04 ENCOUNTER — MYC REFILL (OUTPATIENT)
Dept: INTERNAL MEDICINE | Facility: CLINIC | Age: 39
End: 2024-04-04
Payer: COMMERCIAL

## 2024-04-04 DIAGNOSIS — E78.00 HYPERCHOLESTEREMIA: ICD-10-CM

## 2024-04-04 DIAGNOSIS — F32.81 PMDD (PREMENSTRUAL DYSPHORIC DISORDER): ICD-10-CM

## 2024-04-05 NOTE — TELEPHONE ENCOUNTER
atorvastatin (LIPITOR) 20 MG tablet       Last Written Prescription Date:  7-26-23  Last Fill Quantity: 180,   # refills: 0  Last Office Visit : 7-17-23  Future Office visit:  7-1-24    Routing refill request to provider for review/approval because:  Gap in RF    Patient Comment: Leaving the country for a month next week so need this refilled before I go

## 2024-04-08 ENCOUNTER — TELEPHONE (OUTPATIENT)
Dept: INTERNAL MEDICINE | Facility: CLINIC | Age: 39
End: 2024-04-08
Payer: COMMERCIAL

## 2024-04-08 DIAGNOSIS — F32.81 PMDD (PREMENSTRUAL DYSPHORIC DISORDER): ICD-10-CM

## 2024-04-08 DIAGNOSIS — E78.00 HYPERCHOLESTEREMIA: ICD-10-CM

## 2024-04-08 RX ORDER — ATORVASTATIN CALCIUM 20 MG/1
40 TABLET, FILM COATED ORAL DAILY
Qty: 180 TABLET | Refills: 0 | Status: SHIPPED | OUTPATIENT
Start: 2024-04-08 | End: 2024-06-25

## 2024-04-08 NOTE — TELEPHONE ENCOUNTER
"atorvastatin (LIPITOR) 20 MG tablet   Disp-180 tablet, R-0   Start: 07/26/2023     FLUoxetine (PROZAC) 20 MG capsule   Disp-90 capsule, R-3   Start: 11/22/2022 7/17/2023  [facility] Internal Medicine Kristy West, Saúl Morrow MD  Internal Medicine    Nv: 7/1/24    Routed because:  request per pt call.  SSRIs Protocol [id number]/04/2024 05:06 PM   Protocol Details PHQ-9 score less than 5 in past 6 months    Recent (6 mo) or future (30 days) visit within the authorizing provider's specialty   Pt states no gap in lipitor, had previous rx fill to use first. Prozac : per last order \" The patient requests that this prescription be held on file for filling in the near future.\"    "

## 2024-04-08 NOTE — TELEPHONE ENCOUNTER
[facility] Call Center    Phone Message    May a detailed message be left on voicemail: yes     Reason for Call: Medication Refill Request    Has the patient contacted the pharmacy for the refill? Yes   Name of medication being requested: FLUoxetine (PROZAC) 20 MG capsule    and     atorvastatin (LIPITOR) 20 MG tablet      Provider who prescribed the medication:   Pharmacy:   [facility] [id number] [place], [place] - [address]     Date medication is needed: ASAP     Per pt is leaving the country on Wednesday and would like a fill for a little over a month since pt will be gone for a whole month. Please send this in asap!    Action Taken: Message routed to:  [facility] PCC    Travel Screening: Not Applicable

## 2024-04-10 RX ORDER — ATORVASTATIN CALCIUM 20 MG/1
40 TABLET, FILM COATED ORAL DAILY
Qty: 180 TABLET | Refills: 0 | OUTPATIENT
Start: 2024-04-10

## 2024-04-12 RX ORDER — ATORVASTATIN CALCIUM 20 MG/1
40 TABLET, FILM COATED ORAL DAILY
Qty: 180 TABLET | Refills: 0 | OUTPATIENT
Start: 2024-04-12

## 2024-06-11 DIAGNOSIS — I10 PRIMARY HYPERTENSION: ICD-10-CM

## 2024-06-19 RX ORDER — LOSARTAN POTASSIUM 25 MG/1
25 TABLET ORAL DAILY
Qty: 90 TABLET | Refills: 0 | Status: SHIPPED | OUTPATIENT
Start: 2024-06-19 | End: 2024-06-25

## 2024-06-25 DIAGNOSIS — F32.81 PMDD (PREMENSTRUAL DYSPHORIC DISORDER): ICD-10-CM

## 2024-06-27 SDOH — HEALTH STABILITY: PHYSICAL HEALTH: ON AVERAGE, HOW MANY DAYS PER WEEK DO YOU ENGAGE IN MODERATE TO STRENUOUS EXERCISE (LIKE A BRISK WALK)?: 2 DAYS

## 2024-06-27 SDOH — HEALTH STABILITY: PHYSICAL HEALTH: ON AVERAGE, HOW MANY MINUTES DO YOU ENGAGE IN EXERCISE AT THIS LEVEL?: 60 MIN

## 2024-06-27 ASSESSMENT — SOCIAL DETERMINANTS OF HEALTH (SDOH): HOW OFTEN DO YOU GET TOGETHER WITH FRIENDS OR RELATIVES?: TWICE A WEEK

## 2024-07-01 ENCOUNTER — LAB (OUTPATIENT)
Dept: LAB | Facility: CLINIC | Age: 39
End: 2024-07-01
Payer: COMMERCIAL

## 2024-07-01 ENCOUNTER — OFFICE VISIT (OUTPATIENT)
Dept: INTERNAL MEDICINE | Facility: CLINIC | Age: 39
End: 2024-07-01
Payer: COMMERCIAL

## 2024-07-01 VITALS
HEART RATE: 70 BPM | HEIGHT: 65 IN | DIASTOLIC BLOOD PRESSURE: 88 MMHG | OXYGEN SATURATION: 96 % | BODY MASS INDEX: 27.54 KG/M2 | SYSTOLIC BLOOD PRESSURE: 127 MMHG | WEIGHT: 165.3 LBS

## 2024-07-01 DIAGNOSIS — I10 PRIMARY HYPERTENSION: ICD-10-CM

## 2024-07-01 DIAGNOSIS — Z00.00 ROUTINE GENERAL MEDICAL EXAMINATION AT A HEALTH CARE FACILITY: Primary | ICD-10-CM

## 2024-07-01 DIAGNOSIS — M26.609 TMJ (TEMPOROMANDIBULAR JOINT SYNDROME): ICD-10-CM

## 2024-07-01 DIAGNOSIS — F32.81 PMDD (PREMENSTRUAL DYSPHORIC DISORDER): ICD-10-CM

## 2024-07-01 DIAGNOSIS — R73.01 IMPAIRED FASTING GLUCOSE: ICD-10-CM

## 2024-07-01 DIAGNOSIS — F41.9 ANXIETY: ICD-10-CM

## 2024-07-01 DIAGNOSIS — Z01.00 VISIT FOR EYE AND VISION EXAM: ICD-10-CM

## 2024-07-01 DIAGNOSIS — E78.00 HYPERCHOLESTEREMIA: ICD-10-CM

## 2024-07-01 DIAGNOSIS — D22.9 SKIN MOLE: ICD-10-CM

## 2024-07-01 LAB
ANION GAP SERPL CALCULATED.3IONS-SCNC: 9 MMOL/L (ref 7–15)
BUN SERPL-MCNC: 8 MG/DL (ref 6–20)
CALCIUM SERPL-MCNC: 9.6 MG/DL (ref 8.6–10)
CHLORIDE SERPL-SCNC: 101 MMOL/L (ref 98–107)
CHOLEST SERPL-MCNC: 213 MG/DL
CREAT SERPL-MCNC: 0.82 MG/DL (ref 0.51–0.95)
DEPRECATED HCO3 PLAS-SCNC: 29 MMOL/L (ref 22–29)
EGFRCR SERPLBLD CKD-EPI 2021: >90 ML/MIN/1.73M2
FASTING STATUS PATIENT QL REPORTED: YES
FASTING STATUS PATIENT QL REPORTED: YES
GLUCOSE SERPL-MCNC: 99 MG/DL (ref 70–99)
HDLC SERPL-MCNC: 50 MG/DL
LDLC SERPL CALC-MCNC: 127 MG/DL
NONHDLC SERPL-MCNC: 163 MG/DL
POTASSIUM SERPL-SCNC: 3.9 MMOL/L (ref 3.4–5.3)
SODIUM SERPL-SCNC: 139 MMOL/L (ref 135–145)
TRIGL SERPL-MCNC: 179 MG/DL

## 2024-07-01 PROCEDURE — 36415 COLL VENOUS BLD VENIPUNCTURE: CPT | Performed by: PATHOLOGY

## 2024-07-01 PROCEDURE — 80061 LIPID PANEL: CPT | Performed by: PATHOLOGY

## 2024-07-01 PROCEDURE — 99214 OFFICE O/P EST MOD 30 MIN: CPT | Mod: 25 | Performed by: INTERNAL MEDICINE

## 2024-07-01 PROCEDURE — 80048 BASIC METABOLIC PNL TOTAL CA: CPT | Performed by: PATHOLOGY

## 2024-07-01 PROCEDURE — 99395 PREV VISIT EST AGE 18-39: CPT | Performed by: INTERNAL MEDICINE

## 2024-07-01 RX ORDER — LORAZEPAM 0.5 MG/1
0.5 TABLET ORAL EVERY 6 HOURS PRN
Qty: 10 TABLET | Refills: 0 | Status: SHIPPED | OUTPATIENT
Start: 2024-07-01

## 2024-07-01 RX ORDER — ATENOLOL AND CHLORTHALIDONE TABLET 100; 25 MG/1; MG/1
1 TABLET ORAL DAILY
Qty: 90 TABLET | Refills: 3 | Status: SHIPPED | OUTPATIENT
Start: 2024-07-01

## 2024-07-01 RX ORDER — LOSARTAN POTASSIUM 25 MG/1
25 TABLET ORAL DAILY
Qty: 90 TABLET | Refills: 3 | Status: SHIPPED | OUTPATIENT
Start: 2024-07-01

## 2024-07-01 RX ORDER — ATORVASTATIN CALCIUM 40 MG/1
40 TABLET, FILM COATED ORAL DAILY
Qty: 90 TABLET | Refills: 3 | Status: SHIPPED | OUTPATIENT
Start: 2024-07-01

## 2024-07-01 ASSESSMENT — PATIENT HEALTH QUESTIONNAIRE - PHQ9
SUM OF ALL RESPONSES TO PHQ QUESTIONS 1-9: 2
5. POOR APPETITE OR OVEREATING: NOT AT ALL

## 2024-07-01 ASSESSMENT — ANXIETY QUESTIONNAIRES
6. BECOMING EASILY ANNOYED OR IRRITABLE: SEVERAL DAYS
5. BEING SO RESTLESS THAT IT IS HARD TO SIT STILL: NOT AT ALL
3. WORRYING TOO MUCH ABOUT DIFFERENT THINGS: SEVERAL DAYS
1. FEELING NERVOUS, ANXIOUS, OR ON EDGE: MORE THAN HALF THE DAYS
GAD7 TOTAL SCORE: 7
2. NOT BEING ABLE TO STOP OR CONTROL WORRYING: SEVERAL DAYS
7. FEELING AFRAID AS IF SOMETHING AWFUL MIGHT HAPPEN: MORE THAN HALF THE DAYS
GAD7 TOTAL SCORE: 7
IF YOU CHECKED OFF ANY PROBLEMS ON THIS QUESTIONNAIRE, HOW DIFFICULT HAVE THESE PROBLEMS MADE IT FOR YOU TO DO YOUR WORK, TAKE CARE OF THINGS AT HOME, OR GET ALONG WITH OTHER PEOPLE: SOMEWHAT DIFFICULT

## 2024-07-01 NOTE — PROGRESS NOTES
Preventive Care Visit  [facility]  Thomas Tapia MD, Internal Medicine  Jul 1, 2024      Assessment & Plan     Routine general medical examination at a health care facility    Pap smear: next due in 2026  Immunizations: she recalls Hep B vaccine being done and will look for records to sent.  Discussed getting tetanus booster at pharmacy.   Lab Studies: as below  Advanced care planning: materials provided     Primary hypertension    SBPs have been good, DBP a bit high in the 80s.  Discussed increasing medication vs lifestyle modification and she is first going to work on being more physically active.  Continue current meds and monitoring.    - atenolol-chlorthalidone (TENORETIC) 100-25 MG tablet; Take 1 tablet by mouth daily  - losartan (COZAAR) 25 MG tablet; Take 1 tablet (25 mg) by mouth daily  - Basic metabolic panel  (Ca, Cl, CO2, Creat, Gluc, K, Na, BUN); Future    Hypercholesteremia    Checking lipids, continue current med pending results.    - atorvastatin (LIPITOR) 40 MG tablet; Take 1 tablet (40 mg) by mouth daily  - Lipid panel reflex to direct LDL Non-fasting; Future    PMDD (premenstrual dysphoric disorder)    Ines has had a couple of short one day episodes of severe symptoms and would like to discuss management of these with psychiatry; referral placed.  She'd like to continue current fluoxetine dose; refilled.    - FLUoxetine (PROZAC) 20 MG capsule; Take 1 capsule (20 mg) by mouth daily  - Adult Mental Health  Referral; Future    Impaired fasting glucose    Normal A1C last year.  She prefers to minimize lab testing at this time, so did not order another A1C.     Skin mole    She saw her prior dermatologist about removal of some facial moles, but her dermatologist said she needed to see plastic surgery.  She called an outside plastic surgeon in this area, but they said she needed to see dermatology first.  She'd like referrals for both derm and  "plastic surgery here to see who would be willing to remove the moles.     - Adult Dermatology  Referral; Future  - Adult Plastic Surgery  Referral; Future    TMJ (temporomandibular joint syndrome)    She requests a referral to see the dental clinic here for a routine exam and discussion of TMJ symptoms.     - Dental Referral; Future    Anxiety    Still has a few pills left from prescription filled last year.  One refill placed on file in case she needs further refill over the next year.     - LORazepam (ATIVAN) 0.5 MG tablet; Take 1 tablet (0.5 mg) by mouth every 6 hours as needed for anxiety (prior to procedure or flight)    Visit for eye and vision exam    She requests a referral for a routine eye exam.    - Adult Eye  Referral; Future            BMI  Estimated body mass index is 27.51 kg/m  as calculated from the following:    Height as of this encounter: 1.651 m (5' 5\").    Weight as of this encounter: 75 kg (165 lb 4.8 oz).   Weight management plan: she is planning on increasing physical activity    Counseling  Appropriate preventive services were discussed with this patient, including applicable screening as appropriate for fall prevention, nutrition, physical activity, Tobacco-use cessation, weight loss and cognition.  Checklist reviewing preventive services available has been given to the patient.  Reviewed patient's diet, addressing concerns and/or questions.   She is at risk for lack of exercise and has been provided with information to increase physical activity for the benefit of her well-being.   The patient was instructed to see the dentist every 6 months.         Return in about 53 weeks (around 7/7/2025) for Annual Wellness Visit.    Yudi Chacon is a 38 year old, presenting for the following:  Physical (Dental referral/Dermatology referral./Ophthalmology referral./Plastic Surgery Referral./Psychiatry referral.)        7/1/2024    10:56 AM   Additional Questions "   Roomed by KTR        Health Care Directive  Patient does not have a Health Care Directive or Living Will: Discussed advance care planning with patient; information given to patient to review.    HPI      Ines requests the following referrals today:    Dental referral - for a routine check.  She has some TMJ issues and tried an non-custom fitted mouth guard that was not helpful.   Dermatology referral. She has some facial moves that she'd like removed.  She called a plastic surgeon at [facility] and they said this needs to be reviewed by dermatology first.  She has not noticed any changes in the moles.    Ophthalmology referral. She has not seen ophtho for awhile and would like to re-establish for annual visits   Psychiatry referral. Ines reports severe depression episodes that lasted a day or so in about October and February, and she is feeling improved currently, and would like to see psychiatry to discuss medication changes.  Episodes are tied her to her cycle.  She has some skills that she learned in therapy that she uses.   She still has a couple of lorazepam left from refill last year.  [place]  reviewed: lorazepam last refilled on 7/12/23    She has a history of migraines that have improved since changing labetalol to losartan.     Home BPs are around 120-130/80s.           7/1/2024    11:43 AM   PHQ   PHQ-9 Total Score 2   Q9: Thoughts of better off dead/self-harm past 2 weeks Not at all             6/27/2024   General Health   How would you rate your overall physical health? (!) FAIR   Feel stress (tense, anxious, or unable to sleep) Rather much      (!) STRESS CONCERN      6/27/2024   Nutrition   Three or more servings of calcium each day? Yes   Diet: Regular (no restrictions)    Low salt   How many servings of fruit and vegetables per day? (!) 2-3   How many sweetened beverages each day? 0-1       Multiple values from one day are sorted in reverse-chronological order          6/27/2024   Exercise   Days per week of moderate/strenous exercise 2 days   Average minutes spent exercising at this level 60 min      (!) EXERCISE CONCERN      6/27/2024   Social Factors   Frequency of gathering with friends or relatives Twice a week   Worry food won't last until get money to buy more No   Food not last or not have enough money for food? No   Do you have housing? (Housing is defined as stable permanent housing and does not include staying ouside in a car, in a tent, in an abandoned building, in an overnight shelter, or couch-surfing.) No   Are you worried about losing your housing? No   Lack of transportation? No   Unable to get utilities (heat,electricity)? No   Want help with housing or utility concern? No      (!) HOUSING CONCERN PRESENT      6/27/2024   Dental   Dentist two times every year? (!) NO            6/27/2024   TB Screening   Were you born outside of the [place]? No            Today's PHQ-2 Score:       7/1/2024    11:43 AM   PHQ-2 ( 1999 [employer])   Q1: Little interest or pleasure in doing things 0   Q2: Feeling down, depressed or hopeless 0   PHQ-2 Score 0           6/27/2024   Substance Use   Alcohol more than 3/day or more than 7/wk No   Do you use any other substances recreationally? (!) ALCOHOL    (!) CANNABIS PRODUCTS       Multiple values from one day are sorted in reverse-chronological order     Social History     Tobacco Use    Smoking status: Never    Smokeless tobacco: Never   Vaping Use    Vaping status: Never Used   Substance Use Topics    Alcohol use: Yes     Alcohol/week: 4.0 standard drinks of alcohol     Types: 4 Glasses of wine per week     Comment: Minimal rn (only after period, pre ovulation)    Drug use: Yes     Types: Marijuana     Comment: Sometimes to sleep or relax I will have a THC / CBD beverage             6/27/2024   Breast Cancer Screening   Family history of breast, colon, or ovarian cancer? No / Unknown               6/27/2024   STI Screening   New sexual  "partner(s) since last STI/HIV test? No        History of abnormal Pap smear: No - age 30- 64 PAP with HPV every 5 years recommended        7/8/2021     2:49 PM   PAP / HPV   PAP-ABSTRACT See Scanned Document           This result is from an external source.           6/27/2024   Contraception/Family Planning   Questions about contraception or family planning No           Reviewed and updated as needed this visit by Provider     Meds  Problems                 10 point ROS of systems including Constitutional, Eyes, Respiratory, Cardiovascular, Gastroenterology, Genitourinary, Integumentary, Muscularskeletal, Psychiatric were all negative except for pertinent positives noted in my HPI.      Objective    Exam  /88 (BP Location: Right arm, Patient Position: Sitting, Cuff Size: Adult Regular)   Pulse 70   Ht 1.651 m (5' 5\")   Wt 75 kg (165 lb 4.8 oz)   SpO2 96%   BMI 27.51 kg/m     Estimated body mass index is 27.51 kg/m  as calculated from the following:    Height as of this encounter: 1.651 m (5' 5\").    Weight as of this encounter: 75 kg (165 lb 4.8 oz).    Physical Exam  GENERAL: alert and no distress  EYES: Eyes grossly normal to inspection, PERRL and conjunctivae and sclerae normal  HENT: ear canals and TM's normal, nose and mouth without ulcers or lesions  NECK: no adenopathy, no asymmetry, masses, or scars  RESP: lungs clear to auscultation - no rales, rhonchi or wheezes  CV: regular rate and rhythm, normal S1 S2, no S3 or S4, no murmur, click or rub, no peripheral edema  ABDOMEN: soft, nontender, no hepatosplenomegaly, no masses and bowel sounds normal  MS: no gross musculoskeletal defects noted, no edema  SKIN: no suspicious lesions or rashes  NEURO: Normal strength and tone, mentation intact and speech normal  PSYCH: mentation appears normal, affect normal/bright        Signed Electronically by: Thomas Tapia MD    "

## 2024-07-01 NOTE — PATIENT INSTRUCTIONS
"Some insurances only cover some vaccines if you get them at the pharmacy and not in clinic.  You can either check your insurance coverage or just plan to get the following vaccines at the pharmacy: Tetanus        Patient Education   Preventive Care Advice   This is general advice we often give to help people stay healthy. Your care team may have specific advice just for you. Please talk to your care team about your own preventive care needs.  Lifestyle  Exercise at least 150 minutes each week (30 minutes a day, 5 days a week).  Do muscle strengthening activities 2 days a week. These help control your weight and prevent disease.  No smoking.  Wear sunscreen to prevent skin cancer.  Have your home tested for radon every 2 to 5 years. Radon is a colorless, odorless gas that can harm your lungs. To learn more, go to www.health.[facility].[place].us and search for \"Radon in Homes.\"  Keep guns unloaded and locked up in a safe place like a safe or gun vault, or, use a gun lock and hide the keys. Always lock away bullets separately. To learn more, visit [employer].[place].gov and search for \"safe gun storage.\"  Nutrition  Eat 5 or more servings of fruits and vegetables each day.  Try wheat bread, brown rice and whole grain pasta (instead of white bread, rice, and pasta).  Get enough calcium and vitamin D. Check the label on foods and aim for 100% of the RDA (recommended daily allowance).  Regular exams  Have a dental exam and cleaning every 6 months.  See your health care team every year to talk about:  Any changes in your health.  Any medicines your care team has prescribed.  Preventive care, family planning, and ways to prevent chronic diseases.  Shots (vaccines)   HPV shots (up to age 26), if you've never had them before.  Hepatitis B shots (up to age 59), if you've never had them before.  COVID-19 shot: Get this shot when it's due.  Flu shot: Get a flu shot every year.  Tetanus shot: Get a tetanus shot every 10 years.  Pneumococcal, " hepatitis A, and RSV shots: Ask your care team if you need these based on your risk.  Shingles shot (for age 50 and up).  General health tests  Diabetes screening:  Starting at age 35, Get screened for diabetes at least every 3 years.  If you are younger than age 35, ask your care team if you should be screened for diabetes.  Cholesterol test: At age 39, start having a cholesterol test every 5 years, or more often if advised.  Bone density scan (DEXA): At age 50, ask your care team if you should have this scan for osteoporosis (brittle bones).  Hepatitis C: Get tested at least once in your life.  Abdominal aortic aneurysm screening: Talk to your doctor about having this screening if you:  Have ever smoked; and  Are biologically male; and  Are between the ages of 65 and 75.  STIs (sexually transmitted infections)  Before age 24: Ask your care team if you should be screened for STIs.  After age 24: Get screened for STIs if you're at risk. You are at risk for STIs (including HIV) if:  You are sexually active with more than one person.  You don't use condoms every time.  You or a partner was diagnosed with a sexually transmitted infection.  If you are at risk for HIV, ask about PrEP medicine to prevent HIV.  Get tested for HIV at least once in your life, whether you are at risk for HIV or not.  Cancer screening tests  Cervical cancer screening: If you have a cervix, begin getting regular cervical cancer screening tests at age 21. Most people who have regular screenings with normal results can stop after age 65. Talk about this with your provider.  Breast cancer scan (mammogram): If you've ever had breasts, begin having regular mammograms starting at age 40. This is a scan to check for breast cancer.  Colon cancer screening: It is important to start screening for colon cancer at age 45.  Have a colonoscopy test every 10 years (or more often if you're at risk) Or, ask your provider about stool tests like a FIT test every  year or Cologuard test every 3 years.  To learn more about your testing options, visit: [email or web address]/[email or web address].  For help making a decision, visit: [email or web address].  Prostate cancer screening test: If you have a prostate and are age 55 to 69, ask your provider if you would benefit from a yearly prostate cancer screening test.  Lung cancer screening: If you are a current or former smoker age 50 to 80, ask your care team if ongoing lung cancer screenings are right for you.  For informational purposes only. Not to replace the advice of your health care provider. Copyright   2023 [employer]. All rights reserved. Clinically reviewed by the [facility] Transitions Program. MYFLY 321438 - REV 04/24.  Learning About Stress  What is stress?     Stress is your body's response to a hard situation. Your body can have a physical, emotional, or mental response. Stress is a fact of life for most people, and it affects everyone differently. What causes stress for you may not be stressful for someone else.  A lot of things can cause stress. You may feel stress when you go on a job interview, take a test, or run a race. This kind of short-term stress is normal and even useful. It can help you if you need to work hard or react quickly. For example, stress can help you finish an important job on time.  Long-term stress is caused by ongoing stressful situations or events. Examples of long-term stress include long-term health problems, ongoing problems at work, or conflicts in your family. Long-term stress can harm your health.  How does stress affect your health?  When you are stressed, your body responds as though you are in danger. It makes hormones that speed up your heart, make you breathe faster, and give you a burst of energy. This is called the fight-or-flight stress response. If the stress is over quickly, your body goes back to normal and no harm is done.  But if stress happens too often or lasts too  long, it can have bad effects. Long-term stress can make you more likely to get sick, and it can make symptoms of some diseases worse. If you tense up when you are stressed, you may develop neck, shoulder, or low back pain. Stress is linked to high blood pressure and heart disease.  Stress also harms your emotional health. It can make you gudino, tense, or depressed. Your relationships may suffer, and you may not do well at work or school.  What can you do to manage stress?  You can try these things to help manage stress:   Do something active. Exercise or activity can help reduce stress. Walking is a great way to get started. Even everyday activities such as housecleaning or yard work can help.  Try yoga or ailyn chi. These techniques combine exercise and meditation. You may need some training at first to learn them.  Do something you enjoy. For example, listen to music or go to a movie. Practice your hobby or do volunteer work.  Meditate. This can help you relax, because you are not worrying about what happened before or what may happen in the future.  Do guided imagery. Imagine yourself in any setting that helps you feel calm. You can use online videos, books, or a teacher to guide you.  Do breathing exercises. For example:  From a standing position, bend forward from the waist with your knees slightly bent. Let your arms dangle close to the floor.  Breathe in slowly and deeply as you return to a standing position. Roll up slowly and lift your head last.  Hold your breath for just a few seconds in the standing position.  Breathe out slowly and bend forward from the waist.  Let your feelings out. Talk, laugh, cry, and express anger when you need to. Talking with supportive friends or family, a counselor, or a jeronimo leader about your feelings is a healthy way to relieve stress. Avoid discussing your feelings with people who make you feel worse.  Write. It may help to write about things that are bothering you. This  "helps you find out how much stress you feel and what is causing it. When you know this, you can find better ways to cope.  What can you do to prevent stress?  You might try some of these things to help prevent stress:  Manage your time. This helps you find time to do the things you want and need to do.  Get enough sleep. Your body recovers from the stresses of the day while you are sleeping.  Get support. Your family, friends, and community can make a difference in how you experience stress.  Limit your news feed. Avoid or limit time on social media or news that may make you feel stressed.  Do something active. Exercise or activity can help reduce stress. Walking is a great way to get started.  Where can you learn more?  Go to https://www.[employer].net/patiented  Enter N032 in the search box to learn more about \"Learning About Stress.\"  Current as of: October 24, 2023               Content Version: 14.0    2715-9400 [employer].   Care instructions adapted under license by your healthcare professional. If you have questions about a medical condition or this instruction, always ask your healthcare professional. [employer] disclaims any warranty or liability for your use of this information.      Substance Use Disorder: Care Instructions  Overview     You can improve your life and health by stopping your use of alcohol or drugs. When you don't drink or use drugs, you may feel and sleep better. You may get along better with your family, friends, and coworkers. There are medicines and programs that can help with substance use disorder.  How can you care for yourself at home?  Here are some ways to help you stay sober and prevent relapse.  If you have been given medicine to help keep you sober or reduce your cravings, be sure to take it exactly as prescribed.  Talk to your doctor about programs that can help you stop using drugs or drinking alcohol.  Do not keep alcohol or drugs in your " home.  Plan ahead. Think about what you'll say if other people ask you to drink or use drugs. Try not to spend time with people who drink or use drugs.  Use the time and money spent on drinking or drugs to do something that's important to you.  Preventing a relapse  Have a plan to deal with relapse. Learn to recognize changes in your thinking that lead you to drink or use drugs. Get help before you start to drink or use drugs again.  Try to stay away from situations, friends, or places that may lead you to drink or use drugs.  If you feel the need to drink alcohol or use drugs again, seek help right away. Call a trusted friend or family member. Some people get support from organizations such as Narcotics Anonymous or ip.access or from treatment facilities.  If you relapse, get help as soon as you can. Some people make a plan with another person that outlines what they want that person to do for them if they relapse. The plan usually includes how to handle the relapse and who to notify in case of relapse.  Don't give up. Remember that a relapse doesn't mean that you have failed. Use the experience to learn the triggers that lead you to drink or use drugs. Then quit again. Recovery is a lifelong process. Many people have several relapses before they are able to quit for good.  Follow-up care is a key part of your treatment and safety. Be sure to make and go to all appointments, and call your doctor if you are having problems. It's also a good idea to know your test results and keep a list of the medicines you take.  When should you call for help?   Call 911  anytime you think you may need emergency care. For example, call if you or someone else:    Has overdosed or has withdrawal signs. Be sure to tell the emergency workers that you are or someone else is using or trying to quit using drugs. Overdose or withdrawal signs may include:  Losing consciousness.  Seizure.  Seeing or hearing things that aren't there  "(hallucinations).     Is thinking or talking about suicide or harming others.   Where to get help 24 hours a day, 7 days a week   If you or someone you know talks about suicide, self-harm, a mental health crisis, a substance use crisis, or any other kind of emotional distress, get help right away. You can:    Call the Suicide and Crisis Lifeline at 988.     Call 6-448-698-TALK (1-719.123.6230).     Text HOME to 109699 to access the Crisis Text Line.   Consider saving these numbers in your phone.  Go to [employer] for more information or to chat online.  Call your doctor now or seek immediate medical care if:    You are having withdrawal symptoms. These may include nausea or vomiting, sweating, shakiness, and anxiety.   Watch closely for changes in your health, and be sure to contact your doctor if:    You have a relapse.     You need more help or support to stop.   Where can you learn more?  Go to https://www.[employer].net/patiented  Enter H573 in the search box to learn more about \"Substance Use Disorder: Care Instructions.\"  Current as of: November 15, 2023               Content Version: 14.0    2955-6116 [email or web address].   Care instructions adapted under license by your healthcare professional. If you have questions about a medical condition or this instruction, always ask your healthcare professional. [email or web address] disclaims any warranty or liability for your use of this information.         "

## 2024-07-26 ENCOUNTER — OFFICE VISIT (OUTPATIENT)
Dept: OPHTHALMOLOGY | Facility: CLINIC | Age: 39
End: 2024-07-26
Attending: OPTOMETRIST
Payer: COMMERCIAL

## 2024-07-26 ENCOUNTER — TELEPHONE (OUTPATIENT)
Dept: CALL CENTER | Age: 39
End: 2024-07-26
Payer: COMMERCIAL

## 2024-07-26 DIAGNOSIS — H11.442 CONJUNCTIVAL CYST OF LEFT EYE: Primary | ICD-10-CM

## 2024-07-26 DIAGNOSIS — H04.123 BILATERAL DRY EYES: ICD-10-CM

## 2024-07-26 DIAGNOSIS — H10.012 ACUTE FOLLICULAR CONJUNCTIVITIS OF LEFT EYE: ICD-10-CM

## 2024-07-26 PROCEDURE — 99213 OFFICE O/P EST LOW 20 MIN: CPT | Performed by: OPTOMETRIST

## 2024-07-26 PROCEDURE — 99203 OFFICE O/P NEW LOW 30 MIN: CPT | Performed by: OPTOMETRIST

## 2024-07-26 RX ORDER — KETOTIFEN FUMARATE 0.35 MG/ML
1 SOLUTION/ DROPS OPHTHALMIC 2 TIMES DAILY
Qty: 5 ML | Refills: 5 | Status: SHIPPED | OUTPATIENT
Start: 2024-07-26

## 2024-07-26 ASSESSMENT — EXTERNAL EXAM - LEFT EYE: OS_EXAM: NORMAL

## 2024-07-26 ASSESSMENT — CONF VISUAL FIELD
OS_NORMAL: 1
OS_INFERIOR_NASAL_RESTRICTION: 0
OS_SUPERIOR_NASAL_RESTRICTION: 0
OS_SUPERIOR_TEMPORAL_RESTRICTION: 0
OD_INFERIOR_NASAL_RESTRICTION: 0
OD_NORMAL: 1
OD_SUPERIOR_NASAL_RESTRICTION: 0
OD_SUPERIOR_TEMPORAL_RESTRICTION: 0
OS_INFERIOR_TEMPORAL_RESTRICTION: 0
OD_INFERIOR_TEMPORAL_RESTRICTION: 0
METHOD: COUNTING FINGERS

## 2024-07-26 ASSESSMENT — TONOMETRY
OD_IOP_MMHG: 10
OS_IOP_MMHG: 15
IOP_METHOD: ICARE

## 2024-07-26 ASSESSMENT — VISUAL ACUITY
OS_SC+: -1
METHOD: SNELLEN - LINEAR
OS_SC: 20/20
OD_SC: 20/20

## 2024-07-26 ASSESSMENT — SLIT LAMP EXAM - LIDS
COMMENTS: NORMAL
COMMENTS: NORMAL

## 2024-07-26 ASSESSMENT — EXTERNAL EXAM - RIGHT EYE: OD_EXAM: NORMAL

## 2024-07-26 NOTE — PATIENT INSTRUCTIONS
Conjunctival cyst   cool compresses left eye   Acute conj allergic Zaditor one drop 2 x per day each eye   Dry eyes Systane or Refresh 3-4 x per day  Recheck if needed

## 2024-07-26 NOTE — NURSING NOTE
"Chief Complaints and History of Present Illnesses   Patient presents with    Conjunctival Cyst Evaluation     Chief Complaint(s) and History of Present Illness(es)       Conjunctival Cyst Evaluation              Laterality: Left scleral    Associated symptoms: red eye (around area of bump) and blurred vision (some blurriness this morning, resolved after blinking).  Negative for lid swelling, lid pain, lid redness, discharge, mattering, tearing and eye pain    Pain scale: 0/10              Comments    Ines Conte is being seen for an evaluation for possible bump on sclera left eye. FBS left eye, itchiness as well. Noticed this for about a day but did have some extra \"movement\" from that eye in the past. Did not take any AT or use any compresses.     Due for CEE in the near future.    Gissel Hernandez on 7/26/2024, at 12:43 PM                        "

## 2024-07-26 NOTE — TELEPHONE ENCOUNTER
Spoke to pt at 1150    New redness/bumpy look on white part of eye temporally times one day-- some redness noted    Foreign body sensation    No pain    Vision stable-- some blurriness after waking up today    Offered exam today and pt accepts    Pt aware of date/time/location/duration/hospital based clinic/pwb location/main clinic number/non-[employer] insurance.    Bc Barker RN 12:00 PM 07/26/24

## 2024-07-26 NOTE — TELEPHONE ENCOUNTER
[facility] Call Center    Phone Message    May a detailed message be left on voicemail: yes     Reason for Call: Symptoms or Concerns     If patient has red-flag symptoms, warm transfer to triage line    Current symptom or concern: Looks like a folded contact lens on the white of the eye. Redness, Skin of eyeball looks/feels bumpy, irritation, left eye    Symptoms have been present for:  1 day(s)    Has patient previously been seen for this? No    By : n/a    Date: n/a    Are there any new or worsening symptoms? No    Action Taken: Message routed to:  [facility]    Travel Screening: Not Applicable     Date of Service:

## 2024-07-26 NOTE — PROGRESS NOTES
Assessment & Plan       Ines Conte is a 38 year old female with the following diagnoses:   1. Conjunctival cyst of left eye - Left Eye    2. Acute follicular conjunctivitis of left eye - Left Eye    3. Bilateral dry eyes - Both Eyes          Conjunctival cyst   cool compresses left eye   Acute conj allergic Zaditor one drop 2 x per day each eye   Dry eyes Systane or Refresh 3-4 x per day  Recheck if needed    Patient disposition:   No follow-ups on file.          Complete documentation of historical and exam elements from today's encounter can be found in the full encounter summary report (not reduplicated in this progress note). I personally obtained the chief complaint(s) and history of present illness.  I confirmed and edited as necessary the review of systems, past medical/surgical history, family history, social history, and examination findings as documented by others; and I examined the patient myself. I personally reviewed the relevant tests, images, and reports as documented above. I formulated and edited as necessary the assessment and plan and discussed the findings and management plan with the patient and family.  Dr. Alec Barlow

## 2024-07-30 ENCOUNTER — MYC MEDICAL ADVICE (OUTPATIENT)
Dept: OPHTHALMOLOGY | Facility: CLINIC | Age: 39
End: 2024-07-30
Payer: COMMERCIAL

## 2024-08-02 ENCOUNTER — OFFICE VISIT (OUTPATIENT)
Dept: OPHTHALMOLOGY | Facility: CLINIC | Age: 39
End: 2024-08-02
Attending: OPTOMETRIST
Payer: COMMERCIAL

## 2024-08-02 DIAGNOSIS — H11.442 CONJUNCTIVAL CYST OF LEFT EYE: Primary | ICD-10-CM

## 2024-08-02 DIAGNOSIS — H10.012 ACUTE FOLLICULAR CONJUNCTIVITIS OF LEFT EYE: ICD-10-CM

## 2024-08-02 DIAGNOSIS — H04.123 BILATERAL DRY EYES: ICD-10-CM

## 2024-08-02 PROCEDURE — 99213 OFFICE O/P EST LOW 20 MIN: CPT | Performed by: OPTOMETRIST

## 2024-08-02 ASSESSMENT — CONF VISUAL FIELD
OS_NORMAL: 1
OS_INFERIOR_TEMPORAL_RESTRICTION: 0
OD_SUPERIOR_TEMPORAL_RESTRICTION: 0
OD_SUPERIOR_NASAL_RESTRICTION: 0
OD_NORMAL: 1
OD_INFERIOR_NASAL_RESTRICTION: 0
OS_SUPERIOR_TEMPORAL_RESTRICTION: 0
OD_INFERIOR_TEMPORAL_RESTRICTION: 0
OS_INFERIOR_NASAL_RESTRICTION: 0
OS_SUPERIOR_NASAL_RESTRICTION: 0

## 2024-08-02 ASSESSMENT — TONOMETRY
OS_IOP_MMHG: 02
OD_IOP_MMHG: 23
IOP_METHOD: ICARE

## 2024-08-02 ASSESSMENT — VISUAL ACUITY
METHOD: SNELLEN - LINEAR
OD_SC: 20/20
OS_SC: 20/20

## 2024-08-02 NOTE — PROGRESS NOTES
Assessment & Plan       Ines Conte is a 38 year old female with the following diagnoses:   1. Conjunctival cyst of left eye - Left Eye    2. Acute follicular conjunctivitis of left eye - Left Eye    3. Bilateral dry eyes - Both Eyes          Conj cyst left eye multiple   Pt refused cyst being drained  Cold compresses and cont Zaditor  Systane 3-4 x per day  RTC if needed      Patient disposition:   No follow-ups on file.          Complete documentation of historical and exam elements from today's encounter can be found in the full encounter summary report (not reduplicated in this progress note). I personally obtained the chief complaint(s) and history of present illness.  I confirmed and edited as necessary the review of systems, past medical/surgical history, family history, social history, and examination findings as documented by others; and I examined the patient myself. I personally reviewed the relevant tests, images, and reports as documented above. I formulated and edited as necessary the assessment and plan and discussed the findings and management plan with the patient and family.  Dr. Alec Barlow

## 2024-08-02 NOTE — PATIENT INSTRUCTIONS
Conj cyst left eye multiple   Pt refused cyst being drained  Cold compresses and cont Zaditor  Systane 3-4 x per day  RTC if needed

## 2024-08-02 NOTE — NURSING NOTE
"Chief Complaints and History of Present Illnesses   Patient presents with    Conjunctival Cyst Follow Up     Follow up for Conjunctival cyst of left eye      Chief Complaint(s) and History of Present Illness(es)       Conjunctival Cyst Follow Up              Associated symptoms: Negative for discharge    Comments: Follow up for Conjunctival cyst of left eye               Comments    Today patient reports \"less abrasive\" left eye, although patient can notice it on the peripheral/looking to the side, worse with blinking.  Some redness although less than last visit.   Ocular Meds:   cool compresses   allergic Zaditor one drop 2 x per day each eye   Dry eyes Systane or Refresh 3-4 x per day    Ely SYED 2:00 PM August 2, 2024                      "

## 2025-01-06 ENCOUNTER — OFFICE VISIT (OUTPATIENT)
Dept: ORTHOPEDICS | Facility: CLINIC | Age: 40
End: 2025-01-06
Payer: COMMERCIAL

## 2025-01-06 ENCOUNTER — PRE VISIT (OUTPATIENT)
Dept: ORTHOPEDICS | Facility: CLINIC | Age: 40
End: 2025-01-06

## 2025-01-06 ENCOUNTER — ANCILLARY PROCEDURE (OUTPATIENT)
Dept: GENERAL RADIOLOGY | Facility: CLINIC | Age: 40
End: 2025-01-06
Attending: STUDENT IN AN ORGANIZED HEALTH CARE EDUCATION/TRAINING PROGRAM
Payer: COMMERCIAL

## 2025-01-06 DIAGNOSIS — S59.909A ELBOW INJURY: ICD-10-CM

## 2025-01-06 DIAGNOSIS — S59.909A ELBOW INJURY: Primary | ICD-10-CM

## 2025-01-06 DIAGNOSIS — M25.521 RIGHT ELBOW PAIN: Primary | ICD-10-CM

## 2025-01-06 PROCEDURE — 73080 X-RAY EXAM OF ELBOW: CPT | Mod: RT | Performed by: RADIOLOGY

## 2025-01-06 PROCEDURE — 99203 OFFICE O/P NEW LOW 30 MIN: CPT | Performed by: STUDENT IN AN ORGANIZED HEALTH CARE EDUCATION/TRAINING PROGRAM

## 2025-01-06 NOTE — LETTER
1/6/2025      RE: Iens Conte  [address]  [address]  [facility] [address]     Dear Colleague,    Thank you for referring your patient, Ines Conte, to the [facility] [place]. Please see a copy of my visit note below.    [facility]  [facility] Center           SUBJECTIVE       Ines Conte is a 39 year old female presenting to clinic today. Today, the patient reports that she was cross country skiing yesterday, 1/5/25, and she fell. She thinks she fell FOOSH onto her wrist. Since she's had some mild pain and difficulties fully extending and flexing the elbow. Pain is located over the posterior elbow.     Background:   Occupation: Desk job  Hand Dominance (If pertinent): Right    Injury (Y/N): Yes  Work Comp (Y/N): No  Date of injury: 1/5/25  Mechanism of Injury: Fall while XC skiing    Duration of symptoms: 1 day    Intensity (1-10): 2/10   Aggravating factors: elbow flexion/extension, pronation/supination   Relieving Factors: Rest   Prior Evaluation: None   Previous Surgery on the area (Y/N): No   Physical Therapy (Previous/Current/None): None    Physical Activity/Exercise (What, How Often): [place], yoga, walk/jog    PMH, Medications and Allergies were reviewed and updated as needed.    ROS:  As noted above otherwise negative.    Patient Active Problem List   Diagnosis     Borderline high cholesterol     Herpes simplex type 2 infection     High blood pressure     Migraine headache     PMDD (premenstrual dysphoric disorder)     Hypercholesteremia     Encounter for IUD insertion     Screening for HIV (human immunodeficiency virus)     Impaired fasting glucose     Screen for STD (sexually transmitted disease)     Jaw and neck stiffness while asleep     Irritable bowel syndrome with both constipation and diarrhea       Current Outpatient Medications   Medication Sig Dispense Refill     acetaminophen-caffeine (EXCEDRIN  TENSION HEADACHE) 500-65 MG TABS Take 2 tablets by mouth every 6 hours as needed for mild pain       atenolol-chlorthalidone (TENORETIC) 100-25 MG tablet Take 1 tablet by mouth daily 90 tablet 3     atorvastatin (LIPITOR) 40 MG tablet Take 1 tablet (40 mg) by mouth daily 90 tablet 3     FLUoxetine (PROZAC) 20 MG capsule Take 1 capsule (20 mg) by mouth daily 90 capsule 3     LORazepam (ATIVAN) 0.5 MG tablet Take 1 tablet (0.5 mg) by mouth every 6 hours as needed for anxiety (prior to procedure or flight) 10 tablet 0     losartan (COZAAR) 25 MG tablet Take 1 tablet (25 mg) by mouth daily 90 tablet 3     MELATONIN PO        valACYclovir (VALTREX) 500 MG tablet 500 mg as needed       ketotifen fumarate 0.035% 0.035 % SOLN ophthalmic solution Place 1 drop into both eyes 2 times daily 5 mL 5            OBJECTIVE:       Vitals: There were no vitals filed for this visit.  BMI: There is no height or weight on file to calculate BMI.    Gen:  Well nourished and in no acute distress  HEENT: Extraocular movement intact  Neck: Supple  Pulm:  Breathing Comfortably. No increased respiratory effort.  Psych: Euthymic. Appropriately answers questions    MSK: Right elbow without evidence of an effusion.  No erythema or warmth.  No edema.  Full range of motion, although painful in the posterior medial aspect of the olecranon.  No tenderness at the radial or ulnar head.  No cubital tunnel tenderness.  No proximal humerus or midshaft radius/ulna tenderness.  Full range of motion in extension, and supination/pronation.  Negative varus and valgus stress testing.  Full range of motion and strength of the wrist.  No tenderness at the wrist.      XRAY : Independent evaluation of the right elbow is without acute osseous abnormalities.          ASSESSMENT and PLAN:     Ines was seen today for follow up.    Diagnoses and all orders for this visit:    Right elbow pain      Ines is a 39-year-old female presenting to clinic today 24 hours after  some form of a fall while cross-country skiing.  Patient is unsure if she fell directly on the hand.  She began to have some pain in the posterior aspect of the elbow, but that has begun to improve since yesterday.  She is without range of motion deficits, but does have pain with full extension of the arm in the posterior medial elbow.  Examination is rather inconclusive of any acute osseous abnormalities, although the patient could very well have a mild sprain as well as a bony contusion.    Plan:  We have discussed her x-rays and her physical exam in detail.  I am reassured the patient is having good range of motion.  At this point, I think it would be beneficial however the patient to go into a sling for comfort for the next 3 to 5 days.  Tylenol or ibuprofen as needed.  She can begin to slowly progress her activities as tolerated.  We have also provided her home exercise program to proceed with after coming out of the sling.  If she is refractory or having a regression, I would want her to return to clinic for repeat examination and likely repeat x-rays after 5 to 7 days.  All questions have been answered.  Return precautions advised.    Options for treatment and/or follow-up care were reviewed with the patient was actively involved in the decision making process. Patient verbalized understanding and was in agreement with the plan.    Allen Fields DO  , Sports Medicine  Department of Family Medicine and [facility]      Again, thank you for allowing me to participate in the care of your patient.      Sincerely,    Allen Fields DO

## 2025-01-06 NOTE — TELEPHONE ENCOUNTER
DIAGNOSIS: right elbow injury during skiing 1/5/24    APPOINTMENT DATE: 1/6/25   NOTES STATUS DETAILS   MEDICATION LIST Internal        No outsided records, new injury

## 2025-01-06 NOTE — PROGRESS NOTES
[facility]  Sports Medicine Clinic  Clinics and Surgery Center           SUBJECTIVE       Ines Conte is a 39 year old female presenting to clinic today. Today, the patient reports that she was cross country skiing yesterday, 1/5/25, and she fell. She thinks she fell FOOSH onto her wrist. Since she's had some mild pain and difficulties fully extending and flexing the elbow. Pain is located over the posterior elbow.     Background:   Occupation: Desk job  Hand Dominance (If pertinent): Right    Injury (Y/N): Yes  Work Comp (Y/N): No  Date of injury: 1/5/25  Mechanism of Injury: Fall while XC skiing    Duration of symptoms: 1 day    Intensity (1-10): 2/10   Aggravating factors: elbow flexion/extension, pronation/supination   Relieving Factors: Rest   Prior Evaluation: None   Previous Surgery on the area (Y/N): No   Physical Therapy (Previous/Current/None): None    Physical Activity/Exercise (What, How Often): [place], yoga, walk/jog    PMH, Medications and Allergies were reviewed and updated as needed.    ROS:  As noted above otherwise negative.    Patient Active Problem List   Diagnosis    Borderline high cholesterol    Herpes simplex type 2 infection    High blood pressure    Migraine headache    PMDD (premenstrual dysphoric disorder)    Hypercholesteremia    Encounter for IUD insertion    Screening for HIV (human immunodeficiency virus)    Impaired fasting glucose    Screen for STD (sexually transmitted disease)    Jaw and neck stiffness while asleep    Irritable bowel syndrome with both constipation and diarrhea       Current Outpatient Medications   Medication Sig Dispense Refill    acetaminophen-caffeine (EXCEDRIN TENSION HEADACHE) 500-65 MG TABS Take 2 tablets by mouth every 6 hours as needed for mild pain      atenolol-chlorthalidone (TENORETIC) 100-25 MG tablet Take 1 tablet by mouth daily 90 tablet 3    atorvastatin (LIPITOR) 40 MG tablet Take 1 tablet (40 mg) by mouth daily 90 tablet 3     FLUoxetine (PROZAC) 20 MG capsule Take 1 capsule (20 mg) by mouth daily 90 capsule 3    LORazepam (ATIVAN) 0.5 MG tablet Take 1 tablet (0.5 mg) by mouth every 6 hours as needed for anxiety (prior to procedure or flight) 10 tablet 0    losartan (COZAAR) 25 MG tablet Take 1 tablet (25 mg) by mouth daily 90 tablet 3    MELATONIN PO       valACYclovir (VALTREX) 500 MG tablet 500 mg as needed      ketotifen fumarate 0.035% 0.035 % SOLN ophthalmic solution Place 1 drop into both eyes 2 times daily 5 mL 5            OBJECTIVE:       Vitals: There were no vitals filed for this visit.  BMI: There is no height or weight on file to calculate BMI.    Gen:  Well nourished and in no acute distress  HEENT: Extraocular movement intact  Neck: Supple  Pulm:  Breathing Comfortably. No increased respiratory effort.  Psych: Euthymic. Appropriately answers questions    MSK: Right elbow without evidence of an effusion.  No erythema or warmth.  No edema.  Full range of motion, although painful in the posterior medial aspect of the olecranon.  No tenderness at the radial or ulnar head.  No cubital tunnel tenderness.  No proximal humerus or midshaft radius/ulna tenderness.  Full range of motion in extension, and supination/pronation.  Negative varus and valgus stress testing.  Full range of motion and strength of the wrist.  No tenderness at the wrist.      XRAY : Independent evaluation of the right elbow is without acute osseous abnormalities.          ASSESSMENT and PLAN:     Ines was seen today for follow up.    Diagnoses and all orders for this visit:    Right elbow pain      Ines is a 39-year-old female presenting to clinic today 24 hours after some form of a fall while cross-country skiing.  Patient is unsure if she fell directly on the hand.  She began to have some pain in the posterior aspect of the elbow, but that has begun to improve since yesterday.  She is without range of motion deficits, but does have pain with full  extension of the arm in the posterior medial elbow.  Examination is rather inconclusive of any acute osseous abnormalities, although the patient could very well have a mild sprain as well as a bony contusion.    Plan:  We have discussed her x-rays and her physical exam in detail.  I am reassured the patient is having good range of motion.  At this point, I think it would be beneficial however the patient to go into a sling for comfort for the next 3 to 5 days.  Tylenol or ibuprofen as needed.  She can begin to slowly progress her activities as tolerated.  We have also provided her home exercise program to proceed with after coming out of the sling.  If she is refractory or having a regression, I would want her to return to clinic for repeat examination and likely repeat x-rays after 5 to 7 days.  All questions have been answered.  Return precautions advised.    Options for treatment and/or follow-up care were reviewed with the patient was actively involved in the decision making process. Patient verbalized understanding and was in agreement with the plan.    Allen Fields DO  , Sports Medicine  Department of Family Medicine and [facility]

## 2025-01-07 NOTE — RESULT ENCOUNTER NOTE
Shakeel Chacon,     Results from Xray as below. Follow the plan we discussed in clinic and lets see how you are doing over the next week. If not improved, return to clinic as we discussed so we can re-evaluate and get another Xray..    - Dr. Fields

## 2025-02-03 ENCOUNTER — TELEPHONE (OUTPATIENT)
Dept: INTERNAL MEDICINE | Facility: CLINIC | Age: 40
End: 2025-02-03
Payer: COMMERCIAL

## 2025-02-03 NOTE — TELEPHONE ENCOUNTER
Left Voicemail (1st Attempt) and Sent Mychart (1st Attempt) for the patient to call back and schedule the following:    Appointment type: [facility] Physical  Provider: Dr. Thomas Tapia  Return date: Approx. 7/7/25  Specialty phone number: [phone]    Additional Notes: Mailbox full; unable to leave message.    Multiple referrals - Please warm transfer.

## 2025-06-26 DIAGNOSIS — F32.81 PMDD (PREMENSTRUAL DYSPHORIC DISORDER): ICD-10-CM

## 2025-06-26 DIAGNOSIS — I10 PRIMARY HYPERTENSION: ICD-10-CM

## 2025-06-26 DIAGNOSIS — E78.00 HYPERCHOLESTEREMIA: ICD-10-CM

## 2025-06-27 RX ORDER — LOSARTAN POTASSIUM 25 MG/1
25 TABLET ORAL DAILY
Qty: 90 TABLET | Refills: 0 | Status: SHIPPED | OUTPATIENT
Start: 2025-06-27

## 2025-06-27 RX ORDER — ATENOLOL AND CHLORTHALIDONE TABLET 100; 25 MG/1; MG/1
1 TABLET ORAL DAILY
Qty: 90 TABLET | Refills: 0 | Status: SHIPPED | OUTPATIENT
Start: 2025-06-27

## 2025-06-27 RX ORDER — ATORVASTATIN CALCIUM 40 MG/1
40 TABLET, FILM COATED ORAL DAILY
Qty: 90 TABLET | Refills: 0 | Status: SHIPPED | OUTPATIENT
Start: 2025-06-27

## 2025-06-27 NOTE — TELEPHONE ENCOUNTER
Last Written Prescription:   Disp Refills Start End LAURA   losartan (COZAAR) 25 MG tablet 90 tablet 3 7/1/2024 -- No   Sig - Route: Take 1 tablet (25 mg) by mouth daily - Oral      Disp Refills Start End LAURA   atenolol-chlorthalidone (TENORETIC) 100-25 MG tablet 90 tablet 3 7/1/2024 -- No   Sig - Route: Take 1 tablet by mouth daily - Oral      Disp Refills Start End LAURA   atorvastatin (LIPITOR) 40 MG tablet 90 tablet 3 7/1/2024 -- No   Sig - Route: Take 1 tablet (40 mg) by mouth daily - Oral     FLUoxetine (PROZAC) 20 MG capsule 90 capsule 3 7/1/2024 -- No   Sig - Route: Take 1 capsule (20 mg) by mouth daily - Oral     ----------------------  Last Visit Date:   7/1/2024  [facility] [place]    Future Visit Date: 0  ----------------------      Refill decision:   [x] Medication refilled per  Medication Refill in Ambulatory Care  policy 90 day supply: losartan, atenolol, atorvastatin   [x] Medication unable to be refilled by RN due to: Overdue labs/test:  PHQ-9 - fluoxetine     Due for RTC, needs appointment scheduled for refills.     **Scheduling has been notified to contact the pt for appointment.    GFR Estimate   Date Value Ref Range Status   07/01/2024 >90 >60 mL/min/1.73m2 Final     Comment:     eGFR calculated using 2021 CKD-EPI equation.     BP Readings from Last 3 Encounters:   07/01/24 127/88   08/08/23 134/89   07/18/23 112/75   Last Comprehensive Metabolic Panel:  Lab Results   Component Value Date     07/01/2024    POTASSIUM 3.9 07/01/2024    CHLORIDE 101 07/01/2024    CO2 29 07/01/2024    ANIONGAP 9 07/01/2024    GLC 99 07/01/2024    BUN 8.0 07/01/2024    CR 0.82 07/01/2024    GFRESTIMATED >90 07/01/2024    PACO 9.6 07/01/2024     Recent Labs   Lab Test 07/01/24  1148 06/29/23  1426   CHOL 213* 297*   HDL 50 49*   * 205*   TRIG 179* 214*         Request from pharmacy:  Requested Prescriptions   Pending Prescriptions Disp Refills    losartan (COZAAR) 25 MG  tablet [Pharmacy Med Name: LOSARTAN POTASSIUM 25 MG TAB] 90 tablet 3     Sig: TAKE 1 TABLET BY MOUTH EVERY DAY       Angiotensin-II Receptors Passed - 6/27/2025  3:56 PM        Passed - Most recent blood pressure under 140/90 in past 12 months     BP Readings from Last 3 Encounters:   07/01/24 127/88   08/08/23 134/89   07/18/23 112/75       No data recorded            Passed - Medication is active on med list and the sig matches. RN to manually verify dose and sig if red X/fail.     If the protocol passes (green check), you do not need to verify med dose and sig.    A prescription matches if they are the same clinical intention.    For Example: once daily and every morning are the same.    The protocol can not identify upper and lower case letters as matching and will fail.     For Example: Take 1 tablet (50 mg) by mouth daily     TAKE 1 TABLET (50 MG) BY MOUTH DAILY    For all fails (red x), verify dose and sig.    If the refill does match what is on file, the RN can still proceed to approve the refill request.       If they do not match, route to the appropriate provider.             Passed - Has GFR on file in past 12 months and most recent value is normal        Passed - Medication indicated for associated diagnosis     The medication is prescribed for one or more of the following conditions:    Chronic Kidney Disease (CDK)    Heart Failure (HF)    Diabetes, Nephropathy   Hypertension    Coronary Artery Disease (CAD)   Raynaud's Disease   Ischemic cardiomyopathy   Cardiomyopathy   Pulmonary Hypertension          Passed - Recent (12 month) or future (90 days) visit with authorizing provider's specialty (provided they have been seen in the past 15 months)     The patient must have completed an in-person or virtual visit within the past 12 months or has a future visit scheduled within the next 90 days with the authorizing provider s specialty.  Urgent care and e-visits do not qualify as an office visit for this  protocol.          Passed - Patient is age 18 or older        Passed - No active pregnancy on record        Passed - Normal serum potassium on file in past 12 months     Recent Labs   Lab Test 07/01/24  1148   POTASSIUM 3.9                    Passed - No positive pregnancy test in past 12 months          atenolol-chlorthalidone (TENORETIC) 100-25 MG tablet [Pharmacy Med Name: ATENOLOL-CHLORTHALIDONE 100-25] 90 tablet 3     Sig: TAKE 1 TABLET BY MOUTH EVERY DAY       Beta-Blockers Protocol Passed - 6/27/2025  3:56 PM        Passed - Most recent blood pressure under 140/90 in past 12 months     BP Readings from Last 3 Encounters:   07/01/24 127/88   08/08/23 134/89   07/18/23 112/75       No data recorded            Passed - Patient is age 6 or older        Passed - Medication is active on med list and the sig matches. RN to manually verify dose and sig if red X/fail.     If the protocol passes (green check), you do not need to verify med dose and sig.    A prescription matches if they are the same clinical intention.    For Example: once daily and every morning are the same.    The protocol can not identify upper and lower case letters as matching and will fail.     For Example: Take 1 tablet (50 mg) by mouth daily     TAKE 1 TABLET (50 MG) BY MOUTH DAILY    For all fails (red x), verify dose and sig.    If the refill does match what is on file, the RN can still proceed to approve the refill request.       If they do not match, route to the appropriate provider.             Passed - Medication indicated for associated diagnosis     Medication is associated with one or more of the following diagnoses:     Hypertension (HTN)   Atrial fibrillation/flutter   Angina   ASCVD   Migraine   Heart Failure   Tremor   Anxiety   Ocular hypertension   Glaucoma   PTSD   Obstructive hypertrophic cardiomyopathy   History of myocarditis   Palpitations   POTS (postural orthostatic tachycardia syndrome)   SVT (supraventricular  tachycardia)   Hyperthyroidism   Tachycardia   Acute non-st segment elevation myocardial infarction   Subsequent non-st segment elevation myocardial infarction   Ischemic myocardial dysfunction          Passed - Recent (12 month) or future (90 days) visit with authorizing provider's specialty (provided they have been seen in the past 15 months)     The patient must have completed an in-person or virtual visit within the past 12 months or has a future visit scheduled within the next 90 days with the authorizing provider s specialty.  Urgent care and e-visits do not qualify as an office visit for this protocol.         Diuretics (Including Combos) Protocol Passed - 6/27/2025  3:56 PM        Passed - Most recent blood pressure under 140/90 in past 12 months     BP Readings from Last 3 Encounters:   07/01/24 127/88   08/08/23 134/89   07/18/23 112/75       No data recorded            Passed - Potassium level on file in past 12 months        Passed - Medication is active on med list and the sig matches. RN to manually verify dose and sig if red X/fail.     If the protocol passes (green check), you do not need to verify med dose and sig.    A prescription matches if they are the same clinical intention.    For Example: once daily and every morning are the same.    The protocol can not identify upper and lower case letters as matching and will fail.     For Example: Take 1 tablet (50 mg) by mouth daily     TAKE 1 TABLET (50 MG) BY MOUTH DAILY    For all fails (red x), verify dose and sig.    If the refill does match what is on file, the RN can still proceed to approve the refill request.       If they do not match, route to the appropriate provider.             Passed - Medication indicated for associated diagnosis     Medication is associated with one or more of the following diagnoses:     Edema   Hypertension   Heart Failure   Meniere's Disease   Bilateral localized swelling of lower limbs   Pulmonary Hypertension           Passed - Has GFR on file in past 12 months and most recent value is normal        Passed - Recent (12 month) or future (90 days) visit with authorizing provider's specialty (provided they have been seen in the past 15 months)     The patient must have completed an in-person or virtual visit within the past 12 months or has a future visit scheduled within the next 90 days with the authorizing provider s specialty.  Urgent care and e-visits do not qualify as an office visit for this protocol.          Passed - Patient is age 18 or older        Passed - No active pregancy on record        Passed - No positive pregnancy test in past 12 months          atorvastatin (LIPITOR) 40 MG tablet [Pharmacy Med Name: ATORVASTATIN 40 MG TABLET] 90 tablet 3     Sig: TAKE 1 TABLET BY MOUTH EVERY DAY       Antihyperlipidemic agents Passed - 6/27/2025  3:56 PM        Passed - LDL on file in the past 12 months        Passed - Medication is active on med list and the sig matches. RN to manually verify dose and sig if red X/fail.     If the protocol passes (green check), you do not need to verify med dose and sig.    A prescription matches if they are the same clinical intention.    For Example: once daily and every morning are the same.    The protocol can not identify upper and lower case letters as matching and will fail.     For Example: Take 1 tablet (50 mg) by mouth daily     TAKE 1 TABLET (50 MG) BY MOUTH DAILY    For all fails (red x), verify dose and sig.    If the refill does match what is on file, the RN can still proceed to approve the refill request.       If they do not match, route to the appropriate provider.             Passed - Recent (12 month) or future (90 days) visit with authorizing provider's specialty (provided they have been seen in the past 15 months)     The patient must have completed an in-person or virtual visit within the past 12 months or has a future visit scheduled within the next 90 days with the  authorizing provider s specialty.  Urgent care and e-visits do not qualify as an office visit for this protocol.          Passed - Patient is age 18 years or older        Passed - No active pregnancy on record        Passed - No positive pregnancy test in past 12 mos          FLUoxetine (PROZAC) 20 MG capsule [Pharmacy Med Name: FLUOXETINE HCL 20 MG CAPSULE] 90 capsule 3     Sig: TAKE 1 CAPSULE BY MOUTH EVERY DAY       SSRIs Protocol Failed - 6/27/2025  3:56 PM        Failed - PHQ-9 score less than 5 in past 6 months     Please review last PHQ-9 score.       7/1/2024    11:43 AM   PHQ-9 SCORE   PHQ-9 Total Score 2             Passed - Medication is active on med list and the sig matches. RN to manually verify dose and sig if red X/fail.     If the protocol passes (green check), you do not need to verify med dose and sig.    A prescription matches if they are the same clinical intention.    For Example: once daily and every morning are the same.    The protocol can not identify upper and lower case letters as matching and will fail.     For Example: Take 1 tablet (50 mg) by mouth daily     TAKE 1 TABLET (50 MG) BY MOUTH DAILY    For all fails (red x), verify dose and sig.    If the refill does match what is on file, the RN can still proceed to approve the refill request.       If they do not match, route to the appropriate provider.             Passed - Recent (12 month) or future (90 days) visit with authorizing provider's specialty (provided they have been seen in the past 15 months)     The patient must have completed an in-person or virtual visit within the past 12 months or has a future visit scheduled within the next 90 days with the authorizing provider s specialty.  Urgent care and e-visits do not qualify as an office visit for this protocol.          Passed - Medication indicated for associated diagnosis     Medication is associated with one or more of the following diagnoses:              Anxiety              Bipolar  Depression  Obsessive-compulsive disorder             Panic disorder  Postmenopausal flushing             Premenstrual dysphoric disorder             Social phobia   Adjustment disorder with depressed mood   Mood disorder   Adjustment disorder with anxious mood          Passed - Patient is age 18 or older        Passed - No active pregnancy on record        Passed - No positive pregnancy test in last 12 months

## 2025-07-30 ENCOUNTER — HOSPITAL ENCOUNTER (EMERGENCY)
Facility: CLINIC | Age: 40
Discharge: HOME | End: 2025-07-31
Attending: EMERGENCY MEDICINE
Payer: COMMERCIAL

## 2025-07-30 DIAGNOSIS — R07.81 PLEURITIC CHEST PAIN: Primary | ICD-10-CM

## 2025-07-30 DIAGNOSIS — R79.89 ELEVATED SERUM HCG: ICD-10-CM

## 2025-07-30 LAB
ANION GAP SERPL CALCULATED.3IONS-SCNC: 12 MMOL/L (ref 7–15)
BASOPHILS # BLD AUTO: 0.1 10E3/UL (ref 0–0.2)
BASOPHILS NFR BLD AUTO: 1 %
BUN SERPL-MCNC: 8.4 MG/DL (ref 6–20)
CALCIUM SERPL-MCNC: 9.3 MG/DL (ref 8.8–10.4)
CHLORIDE SERPL-SCNC: 105 MMOL/L (ref 98–107)
CREAT SERPL-MCNC: 0.78 MG/DL (ref 0.51–0.95)
D DIMER PPP FEU-MCNC: 0.78 UG/ML FEU (ref 0–0.5)
EGFRCR SERPLBLD CKD-EPI 2021: >90 ML/MIN/1.73M2
EOSINOPHIL # BLD AUTO: 0.2 10E3/UL (ref 0–0.7)
EOSINOPHIL NFR BLD AUTO: 3 %
ERYTHROCYTE [DISTWIDTH] IN BLOOD BY AUTOMATED COUNT: 15.9 % (ref 10–15)
GLUCOSE SERPL-MCNC: 131 MG/DL (ref 70–99)
HCO3 SERPL-SCNC: 23 MMOL/L (ref 22–29)
HCT VFR BLD AUTO: 33.2 % (ref 35–47)
HGB BLD-MCNC: 10.3 G/DL (ref 11.7–15.7)
IMM GRANULOCYTES # BLD: 0 10E3/UL
IMM GRANULOCYTES NFR BLD: 0 %
LYMPHOCYTES # BLD AUTO: 1.8 10E3/UL (ref 0.8–5.3)
LYMPHOCYTES NFR BLD AUTO: 27 %
MCH RBC QN AUTO: 24.8 PG (ref 26.5–33)
MCHC RBC AUTO-ENTMCNC: 31 G/DL (ref 31.5–36.5)
MCV RBC AUTO: 80 FL (ref 78–100)
MONOCYTES # BLD AUTO: 0.4 10E3/UL (ref 0–1.3)
MONOCYTES NFR BLD AUTO: 6 %
NEUTROPHILS # BLD AUTO: 4.1 10E3/UL (ref 1.6–8.3)
NEUTROPHILS NFR BLD AUTO: 63 %
NRBC # BLD AUTO: 0 10E3/UL
NRBC BLD AUTO-RTO: 0 /100
PLATELET # BLD AUTO: 273 10E3/UL (ref 150–450)
POTASSIUM SERPL-SCNC: 3.6 MMOL/L (ref 3.4–5.3)
RBC # BLD AUTO: 4.15 10E6/UL (ref 3.8–5.2)
SODIUM SERPL-SCNC: 140 MMOL/L (ref 135–145)
TROPONIN T SERPL HS-MCNC: <6 NG/L
WBC # BLD AUTO: 6.5 10E3/UL (ref 4–11)

## 2025-07-30 PROCEDURE — 93308 TTE F-UP OR LMTD: CPT | Performed by: EMERGENCY MEDICINE

## 2025-07-30 PROCEDURE — 36415 COLL VENOUS BLD VENIPUNCTURE: CPT | Performed by: EMERGENCY MEDICINE

## 2025-07-30 PROCEDURE — 99285 EMERGENCY DEPT VISIT HI MDM: CPT | Mod: 25 | Performed by: EMERGENCY MEDICINE

## 2025-07-30 PROCEDURE — 84484 ASSAY OF TROPONIN QUANT: CPT | Performed by: EMERGENCY MEDICINE

## 2025-07-30 PROCEDURE — 80048 BASIC METABOLIC PNL TOTAL CA: CPT | Performed by: EMERGENCY MEDICINE

## 2025-07-30 PROCEDURE — 85004 AUTOMATED DIFF WBC COUNT: CPT | Performed by: EMERGENCY MEDICINE

## 2025-07-30 PROCEDURE — 99284 EMERGENCY DEPT VISIT MOD MDM: CPT | Mod: 25 | Performed by: EMERGENCY MEDICINE

## 2025-07-30 PROCEDURE — 250N000013 HC RX MED GY IP 250 OP 250 PS 637: Performed by: EMERGENCY MEDICINE

## 2025-07-30 PROCEDURE — 93308 TTE F-UP OR LMTD: CPT | Mod: 26 | Performed by: EMERGENCY MEDICINE

## 2025-07-30 PROCEDURE — 93010 ELECTROCARDIOGRAM REPORT: CPT | Performed by: EMERGENCY MEDICINE

## 2025-07-30 PROCEDURE — 84702 CHORIONIC GONADOTROPIN TEST: CPT | Performed by: EMERGENCY MEDICINE

## 2025-07-30 PROCEDURE — 85379 FIBRIN DEGRADATION QUANT: CPT | Performed by: EMERGENCY MEDICINE

## 2025-07-30 PROCEDURE — 93005 ELECTROCARDIOGRAM TRACING: CPT | Performed by: EMERGENCY MEDICINE

## 2025-07-30 PROCEDURE — 84703 CHORIONIC GONADOTROPIN ASSAY: CPT | Performed by: EMERGENCY MEDICINE

## 2025-07-30 PROCEDURE — 93005 ELECTROCARDIOGRAM TRACING: CPT | Mod: 76 | Performed by: EMERGENCY MEDICINE

## 2025-07-30 RX ORDER — ASPIRIN 81 MG/1
324 TABLET, CHEWABLE ORAL ONCE
Status: COMPLETED | OUTPATIENT
Start: 2025-07-30 | End: 2025-07-30

## 2025-07-30 RX ORDER — MAGNESIUM HYDROXIDE/ALUMINUM HYDROXICE/SIMETHICONE 120; 1200; 1200 MG/30ML; MG/30ML; MG/30ML
30 SUSPENSION ORAL ONCE
Status: COMPLETED | OUTPATIENT
Start: 2025-07-30 | End: 2025-07-30

## 2025-07-30 RX ADMIN — ASPIRIN 81 MG CHEWABLE TABLET 324 MG: 81 TABLET CHEWABLE at 23:24

## 2025-07-30 RX ADMIN — ALUMINUM HYDROXIDE, MAGNESIUM HYDROXIDE, AND SIMETHICONE 30 ML: 200; 200; 20 SUSPENSION ORAL at 23:25

## 2025-07-30 ASSESSMENT — COLUMBIA-SUICIDE SEVERITY RATING SCALE - C-SSRS
6. HAVE YOU EVER DONE ANYTHING, STARTED TO DO ANYTHING, OR PREPARED TO DO ANYTHING TO END YOUR LIFE?: NO
2. HAVE YOU ACTUALLY HAD ANY THOUGHTS OF KILLING YOURSELF IN THE PAST MONTH?: NO
1. IN THE PAST MONTH, HAVE YOU WISHED YOU WERE DEAD OR WISHED YOU COULD GO TO SLEEP AND NOT WAKE UP?: NO

## 2025-07-30 ASSESSMENT — ACTIVITIES OF DAILY LIVING (ADL): ADLS_ACUITY_SCORE: 41

## 2025-07-31 ENCOUNTER — APPOINTMENT (OUTPATIENT)
Dept: CT IMAGING | Facility: CLINIC | Age: 40
End: 2025-07-31
Attending: EMERGENCY MEDICINE
Payer: COMMERCIAL

## 2025-07-31 VITALS
HEIGHT: 64 IN | HEART RATE: 88 BPM | SYSTOLIC BLOOD PRESSURE: 143 MMHG | DIASTOLIC BLOOD PRESSURE: 100 MMHG | WEIGHT: 160 LBS | BODY MASS INDEX: 27.31 KG/M2 | RESPIRATION RATE: 18 BRPM | OXYGEN SATURATION: 100 % | TEMPERATURE: 97.9 F

## 2025-07-31 LAB
ATRIAL RATE - MUSE: 73 BPM
ATRIAL RATE - MUSE: 93 BPM
DIASTOLIC BLOOD PRESSURE - MUSE: NORMAL MMHG
DIASTOLIC BLOOD PRESSURE - MUSE: NORMAL MMHG
HCG INTACT+B SERPL-ACNC: 11 MIU/ML
HCG SERPL QL: POSITIVE
INTERPRETATION ECG - MUSE: NORMAL
INTERPRETATION ECG - MUSE: NORMAL
P AXIS - MUSE: 25 DEGREES
P AXIS - MUSE: 38 DEGREES
PR INTERVAL - MUSE: 126 MS
PR INTERVAL - MUSE: 126 MS
QRS DURATION - MUSE: 72 MS
QRS DURATION - MUSE: 76 MS
QT - MUSE: 372 MS
QT - MUSE: 396 MS
QTC - MUSE: 436 MS
QTC - MUSE: 462 MS
R AXIS - MUSE: 28 DEGREES
R AXIS - MUSE: 43 DEGREES
SYSTOLIC BLOOD PRESSURE - MUSE: NORMAL MMHG
SYSTOLIC BLOOD PRESSURE - MUSE: NORMAL MMHG
T AXIS - MUSE: 23 DEGREES
T AXIS - MUSE: 61 DEGREES
TROPONIN T SERPL HS-MCNC: <6 NG/L
VENTRICULAR RATE- MUSE: 73 BPM
VENTRICULAR RATE- MUSE: 93 BPM

## 2025-07-31 PROCEDURE — 84484 ASSAY OF TROPONIN QUANT: CPT | Performed by: EMERGENCY MEDICINE

## 2025-07-31 PROCEDURE — 250N000011 HC RX IP 250 OP 636: Performed by: EMERGENCY MEDICINE

## 2025-07-31 PROCEDURE — 250N000013 HC RX MED GY IP 250 OP 250 PS 637: Performed by: EMERGENCY MEDICINE

## 2025-07-31 PROCEDURE — 36415 COLL VENOUS BLD VENIPUNCTURE: CPT | Performed by: EMERGENCY MEDICINE

## 2025-07-31 PROCEDURE — 71275 CT ANGIOGRAPHY CHEST: CPT

## 2025-07-31 PROCEDURE — 71275 CT ANGIOGRAPHY CHEST: CPT | Mod: 26 | Performed by: RADIOLOGY

## 2025-07-31 RX ORDER — OXYCODONE HYDROCHLORIDE 5 MG/1
5 TABLET ORAL EVERY 6 HOURS PRN
Qty: 6 TABLET | Refills: 0 | Status: SHIPPED | OUTPATIENT
Start: 2025-07-31

## 2025-07-31 RX ORDER — OXYCODONE HYDROCHLORIDE 5 MG/1
5 TABLET ORAL ONCE
Refills: 0 | Status: COMPLETED | OUTPATIENT
Start: 2025-07-31 | End: 2025-07-31

## 2025-07-31 RX ORDER — IOPAMIDOL 755 MG/ML
58 INJECTION, SOLUTION INTRAVASCULAR ONCE
Status: COMPLETED | OUTPATIENT
Start: 2025-07-31 | End: 2025-07-31

## 2025-07-31 RX ADMIN — OXYCODONE HYDROCHLORIDE 5 MG: 5 TABLET ORAL at 00:31

## 2025-07-31 RX ADMIN — IOPAMIDOL 58 ML: 755 INJECTION, SOLUTION INTRAVENOUS at 02:15

## 2025-07-31 ASSESSMENT — ACTIVITIES OF DAILY LIVING (ADL)
ADLS_ACUITY_SCORE: 41

## 2025-07-31 NOTE — ED NOTES
Chest pain onset at rest. Pain increases with activity on deep breathes.  Hypertensive, afebrile, OVSS on RA.  Denies nausea.  Spouse at bedside.

## 2025-07-31 NOTE — ED PROVIDER NOTES
"  History     Chief Complaint   Patient presents with    Chest Pain     HPI  Ines Conte is a 39 year old female with PMH notable for HTN, irritable bowel syndrome who presents to the ED with chest pain.  Patient points just left of sternal border as location of the pain.  Pain started around 7 PM, was sitting down at that time.  Pain is worsened with deep breath, does not feel short of breath.  No leg pain or swelling, no history of DVT/PE, no exogenous estrogen, no active malignancy or recent immobilization.  No recent cough or fever.     Physical Exam   BP: (!) 154/120 (R arm, Left Arm 171/117)  Pulse: 88  Temp: 98.2  F (36.8  C)  Resp: 18  Height: 162.6 cm (5' 4\")  Weight: 72.6 kg (160 lb)  SpO2: 100 %    Physical Exam  General: no acute distress. Appears stated age.   HENT: MMM, no oropharyngeal lesions  Eyes: PERRL, normal sclerae   Cardio: Regular rate. Regular rhythm. Extremities well perfused  Resp: Normal work of breathing, Normal respiratory rate.   Chest: no tenderness of the chest wall  Neuro: alert without signs of confusion. CN II-XII grossly intact. Grossly normal strength and sensation in all extremities.   Psych: normal affect, normal behavior      ED Course      Procedures  Results for orders placed during the hospital encounter of 07/30/25    POC US ECHO LIMITED    Impression  Limited Bedside ED Cardiac Ultrasound  PROCEDURE: PERFORMED BY: Dr. Amish Zabala MD  INDICATIONS/SYMPTOM:  Chest Pain  PROBE: Cardiac phased array probe  BODY LOCATION: Chest (cardiac)  FINDINGS: The ultrasound was performed utilizing the subcostal, parasternal long axis, parasternal short axis, and apical 4 chamber views.  Grossly normal LV contractility. No RV dilation visualized. No pericardial effusion visualized. IVC grossly mid-range in diameter with < 50% respiratory variability.  INTERPRETATION:    Grossly normal LV contractility. No pericardial effusion. No RV dilation. IVC size and variability consistent " with euvolemia.  IMAGE DOCUMENTATION: Images were archived to PACs system.            EKG Interpretation:      Interpreted by Amish Zabala MD  Time reviewed: 2240  Symptoms at time of EKG: chest pain   Rhythm: normal sinus   Rate: normal  Axis: normal  Ectopy: none  Conduction: normal  ST Segments/ T Waves: No ST-T wave changes  Q Waves: none  Comparison to prior: Unavailable    Clinical Impression: normal EKG           EKG Interpretation:      Interpreted by Amish Zabala MD  Time reviewed: 0041  Symptoms at time of EKG: chest pain   Rhythm: normal sinus   Rate: normal  Axis: normal  Ectopy: none  Conduction: normal  ST Segments/ T Waves: No ST-T wave changes  Q Waves: none  Comparison to prior: Unchanged from 7/30/2025 at 2229    Clinical Impression: normal EKG     Labs Ordered and Resulted from Time of ED Arrival to Time of ED Departure   BASIC METABOLIC PANEL - Abnormal       Result Value    Sodium 140      Potassium 3.6      Chloride 105      Carbon Dioxide (CO2) 23      Anion Gap 12      Urea Nitrogen 8.4      Creatinine 0.78      GFR Estimate >90      Calcium 9.3      Glucose 131 (*)    D DIMER QUANTITATIVE - Abnormal    D-Dimer Quantitative 0.78 (*)    HCG QUALITATIVE PREGNANCY - Abnormal    hCG Serum Qualitative Positive (*)    CBC WITH PLATELETS AND DIFFERENTIAL - Abnormal    WBC Count 6.5      RBC Count 4.15      Hemoglobin 10.3 (*)     Hematocrit 33.2 (*)     MCV 80      MCH 24.8 (*)     MCHC 31.0 (*)     RDW 15.9 (*)     Platelet Count 273      % Neutrophils 63      % Lymphocytes 27      % Monocytes 6      % Eosinophils 3      % Basophils 1      % Immature Granulocytes 0      NRBCs per 100 WBC 0      Absolute Neutrophils 4.1      Absolute Lymphocytes 1.8      Absolute Monocytes 0.4      Absolute Eosinophils 0.2      Absolute Basophils 0.1      Absolute Immature Granulocytes 0.0      Absolute NRBCs 0.0     HCG QUANTITATIVE PREGNANCY - Abnormal    hCG Quantitative 11 (*)    TROPONIN T, HIGH  SENSITIVITY - Normal    Troponin T, High Sensitivity <6     TROPONIN T, HIGH SENSITIVITY - Normal    Troponin T, High Sensitivity <6       POC US ECHO LIMITED   Final Result   Limited Bedside ED Cardiac Ultrasound   PROCEDURE: PERFORMED BY: Dr. Amish Zabala MD   INDICATIONS/SYMPTOM:  Chest Pain   PROBE: Cardiac phased array probe   BODY LOCATION: Chest (cardiac)   FINDINGS: The ultrasound was performed utilizing the subcostal, parasternal long axis, parasternal short axis, and apical 4 chamber views.   Grossly normal LV contractility. No RV dilation visualized. No pericardial effusion visualized. IVC grossly mid-range in diameter with < 50% respiratory variability.    INTERPRETATION:    Grossly normal LV contractility. No pericardial effusion. No RV dilation. IVC size and variability consistent with euvolemia.    IMAGE DOCUMENTATION: Images were archived to PACs system.         CT Chest Pulmonary Embolism w Contrast    (Results Pending)            Medical Decision Making  The patient's presentation was of high complexity (an acute health issue posing potential threat to life or bodily function).    The patient's evaluation involved:  ordering and/or review of 3+ test(s) in this encounter (see separate area of note for details)    The patient's management necessitated moderate risk (prescription drug management including medications given in the ED).      Assessments & Plan   Patient presenting with acute chest pain worse with breathing. Vitals in the ED mildly elevated BP, was unremarkable. Nursing notes reviewed.     EKG x 2 showed normal sinus rhythm without evidence of acute ischemia, high-sensitivity troponin x 2 undetectably low -ACS very unlikely.  Bedside cardiac ultrasound showed grossly normal LV contractility, no RV dilation, no pericardial effusion.  Lack of effusion and EKG changes makes pericarditis very unlikely.  PE risk factor is low, pain is pleuritic, patient appropriate for D-dimer risk  ratification for PE.  D-dimer elevated.      Qualitative serum pregnancy test was faintly positive.  Quantitative hCG 11.  Discussed finding with patient, reported having LMP 1 week ago.  Pregnancy fairly unlikely in that scenario.  Discussed the risk, benefits, indications, and alternatives of CT pulmonary angiogram in the context of possible early pregnancy.  Patient demonstrated understanding and capacity, elected to proceed with CT.    In the ED, the patient's symptoms were managed with Maalox without improvement.  Pain worsened, repeat EKG was obtained, and patient then given low-dose oxycodone for pain.    Patient signed out to oncoming ED provider with plan to follow-up CT pulmonary angiogram, likely discharge home if not showing concerning findings.  Prepare discharge instructions for following up with primary care in a few days for recheck of chest pain symptoms, recheck of hCG and further assessment of the hCG elevation.    Final diagnoses:   Pleuritic chest pain   Elevated serum hCG     New Prescriptions    No medications on file     --  Amish Zabala MD   Emergency Medicine   [facility] EMERGENCY DEPARTMENT  7/30/2025       Amish Zabala MD  07/31/25 0223

## 2025-07-31 NOTE — ED TRIAGE NOTES
Patient to triage with c/o chest pain. Patient stated pain started around 7 pm tonight and described it as dull and gradually increased to sharp pain. Denies shortness of breathe but reports pain is worse when having deep breathes.      Triage Assessment (Adult)       Row Name 07/30/25 6538          Triage Assessment    Airway WDL WDL        Respiratory WDL    Respiratory WDL WDL        Skin Circulation/Temperature WDL    Skin Circulation/Temperature WDL WDL        Cardiac WDL    Cardiac WDL chest pain        [place] Coma Scale    Best Eye Response 4-->(E4) spontaneous     Best Motor Response 6-->(M6) obeys commands     Best Verbal Response 5-->(V5) oriented     [place] Coma Scale Score 15

## 2025-07-31 NOTE — DISCHARGE INSTRUCTIONS
Instructions from your doctor today:  Emergency Department (ED) testing is focused on the potential causes of your symptoms that are the most dangerous possibilities, and cannot cover every possibility. Based on the evaluation, it was deemed sufficiently safe to discharge and continue management through the clinics. Thus, follow-up is very important to assess for improvement/worsening, potential further testing, and potential treatment adjustments. If you were given opioid pain medications or other medications that can make you drowsy while in the ED, you should not drive for at least several hours and not until you feel completely back to normal.     Please make an appointment to follow up with:  - Your Primary Care Provider in 3-7 days for recheck of chest pain, recheck of hCG level, and further assessment of hCG elevation. Your OBGYN can also help you have your hCG level rechecked.   - If you do not have a primary care provider, you can be seen in follow-up and establish care by calling any of the clinics below:     - [facility] (phone: [phone])     - [facility] (phone: [phone])   - Have your clinic provider review the results from today's visit with you again, including any potential follow-up or additional testing that may be needed based on the results. Occasionally, incidental findings are found on later review by radiologists that may need follow-up.     Return to the Emergency Department immediately if you have worsening symptoms, or any other urgent health concerns.

## 2025-08-04 ENCOUNTER — OFFICE VISIT (OUTPATIENT)
Dept: INTERNAL MEDICINE | Facility: CLINIC | Age: 40
End: 2025-08-04
Payer: COMMERCIAL

## 2025-08-04 ENCOUNTER — LAB (OUTPATIENT)
Dept: LAB | Facility: CLINIC | Age: 40
End: 2025-08-04
Payer: COMMERCIAL

## 2025-08-04 ENCOUNTER — RESULTS FOLLOW-UP (OUTPATIENT)
Dept: INTERNAL MEDICINE | Facility: CLINIC | Age: 40
End: 2025-08-04

## 2025-08-04 VITALS
WEIGHT: 161.1 LBS | HEART RATE: 94 BPM | DIASTOLIC BLOOD PRESSURE: 90 MMHG | SYSTOLIC BLOOD PRESSURE: 135 MMHG | RESPIRATION RATE: 18 BRPM | BODY MASS INDEX: 27.5 KG/M2 | OXYGEN SATURATION: 100 % | HEIGHT: 64 IN | TEMPERATURE: 98.8 F

## 2025-08-04 DIAGNOSIS — Z32.01 PREGNANCY TEST POSITIVE: Primary | ICD-10-CM

## 2025-08-04 DIAGNOSIS — E78.00 HYPERCHOLESTEREMIA: ICD-10-CM

## 2025-08-04 DIAGNOSIS — Z32.01 PREGNANCY TEST POSITIVE: ICD-10-CM

## 2025-08-04 DIAGNOSIS — R07.9 CHEST PAIN, UNSPECIFIED TYPE: ICD-10-CM

## 2025-08-04 LAB
CHOLEST SERPL-MCNC: 223 MG/DL
FASTING STATUS PATIENT QL REPORTED: YES
HCG INTACT+B SERPL-ACNC: 112 MIU/ML
HDLC SERPL-MCNC: 45 MG/DL
LDLC SERPL CALC-MCNC: 157 MG/DL
NONHDLC SERPL-MCNC: 178 MG/DL
TRIGL SERPL-MCNC: 107 MG/DL

## 2025-08-04 PROCEDURE — 84702 CHORIONIC GONADOTROPIN TEST: CPT | Performed by: PATHOLOGY

## 2025-08-04 PROCEDURE — 80061 LIPID PANEL: CPT | Performed by: PATHOLOGY

## 2025-08-04 PROCEDURE — 36415 COLL VENOUS BLD VENIPUNCTURE: CPT | Performed by: PATHOLOGY

## 2025-08-04 PROCEDURE — 99214 OFFICE O/P EST MOD 30 MIN: CPT | Performed by: INTERNAL MEDICINE

## 2025-08-04 RX ORDER — ATORVASTATIN CALCIUM 40 MG/1
40 TABLET, FILM COATED ORAL DAILY
Qty: 90 TABLET | Refills: 3 | Status: SHIPPED | OUTPATIENT
Start: 2025-08-04

## 2025-08-07 ENCOUNTER — LAB (OUTPATIENT)
Dept: LAB | Facility: CLINIC | Age: 40
End: 2025-08-07
Payer: COMMERCIAL

## 2025-08-07 ENCOUNTER — ANCILLARY PROCEDURE (OUTPATIENT)
Dept: ULTRASOUND IMAGING | Facility: CLINIC | Age: 40
End: 2025-08-07
Attending: INTERNAL MEDICINE
Payer: COMMERCIAL

## 2025-08-07 DIAGNOSIS — Z32.01 PREGNANCY TEST POSITIVE: ICD-10-CM

## 2025-08-07 LAB — HCG INTACT+B SERPL-ACNC: 413 MIU/ML

## 2025-08-07 PROCEDURE — 76817 TRANSVAGINAL US OBSTETRIC: CPT | Mod: GC | Performed by: RADIOLOGY

## 2025-08-07 PROCEDURE — 76801 OB US < 14 WKS SINGLE FETUS: CPT | Mod: GC | Performed by: RADIOLOGY

## 2025-08-10 ENCOUNTER — HEALTH MAINTENANCE LETTER (OUTPATIENT)
Age: 40
End: 2025-08-10

## 2025-08-13 ENCOUNTER — TELEPHONE (OUTPATIENT)
Dept: OBGYN | Facility: CLINIC | Age: 40
End: 2025-08-13
Payer: COMMERCIAL

## 2025-08-13 ENCOUNTER — RESULTS FOLLOW-UP (OUTPATIENT)
Dept: INTERNAL MEDICINE | Facility: CLINIC | Age: 40
End: 2025-08-13

## 2025-08-13 ENCOUNTER — LAB (OUTPATIENT)
Dept: LAB | Facility: CLINIC | Age: 40
End: 2025-08-13
Attending: ADVANCED PRACTICE MIDWIFE
Payer: COMMERCIAL

## 2025-08-13 ENCOUNTER — OFFICE VISIT (OUTPATIENT)
Dept: OBGYN | Facility: CLINIC | Age: 40
End: 2025-08-13
Attending: ADVANCED PRACTICE MIDWIFE
Payer: COMMERCIAL

## 2025-08-13 ENCOUNTER — RESULTS FOLLOW-UP (OUTPATIENT)
Dept: OBGYN | Facility: CLINIC | Age: 40
End: 2025-08-13

## 2025-08-13 ENCOUNTER — NURSE TRIAGE (OUTPATIENT)
Dept: NURSING | Facility: CLINIC | Age: 40
End: 2025-08-13
Payer: COMMERCIAL

## 2025-08-13 VITALS
DIASTOLIC BLOOD PRESSURE: 101 MMHG | SYSTOLIC BLOOD PRESSURE: 142 MMHG | BODY MASS INDEX: 27.96 KG/M2 | WEIGHT: 163 LBS | HEART RATE: 82 BPM

## 2025-08-13 DIAGNOSIS — O20.9 BLEEDING IN EARLY PREGNANCY: ICD-10-CM

## 2025-08-13 DIAGNOSIS — I10 PRIMARY HYPERTENSION: ICD-10-CM

## 2025-08-13 DIAGNOSIS — I10 HYPERTENSION, UNSPECIFIED TYPE: ICD-10-CM

## 2025-08-13 DIAGNOSIS — E78.00 HYPERCHOLESTEREMIA: ICD-10-CM

## 2025-08-13 DIAGNOSIS — N96 RECURRENT PREGNANCY LOSS: ICD-10-CM

## 2025-08-13 DIAGNOSIS — O20.9 BLEEDING IN EARLY PREGNANCY: Primary | ICD-10-CM

## 2025-08-13 LAB — HCG INTACT+B SERPL-ACNC: 31 MIU/ML

## 2025-08-13 PROCEDURE — 99215 OFFICE O/P EST HI 40 MIN: CPT | Performed by: ADVANCED PRACTICE MIDWIFE

## 2025-08-13 PROCEDURE — 84702 CHORIONIC GONADOTROPIN TEST: CPT

## 2025-08-13 PROCEDURE — 36415 COLL VENOUS BLD VENIPUNCTURE: CPT

## 2025-08-13 PROCEDURE — 99213 OFFICE O/P EST LOW 20 MIN: CPT | Performed by: ADVANCED PRACTICE MIDWIFE

## 2025-08-13 ASSESSMENT — ANXIETY QUESTIONNAIRES
7. FEELING AFRAID AS IF SOMETHING AWFUL MIGHT HAPPEN: NOT AT ALL
8. IF YOU CHECKED OFF ANY PROBLEMS, HOW DIFFICULT HAVE THESE MADE IT FOR YOU TO DO YOUR WORK, TAKE CARE OF THINGS AT HOME, OR GET ALONG WITH OTHER PEOPLE?: NOT DIFFICULT AT ALL
5. BEING SO RESTLESS THAT IT IS HARD TO SIT STILL: NOT AT ALL
GAD7 TOTAL SCORE: 5
IF YOU CHECKED OFF ANY PROBLEMS ON THIS QUESTIONNAIRE, HOW DIFFICULT HAVE THESE PROBLEMS MADE IT FOR YOU TO DO YOUR WORK, TAKE CARE OF THINGS AT HOME, OR GET ALONG WITH OTHER PEOPLE: NOT DIFFICULT AT ALL
2. NOT BEING ABLE TO STOP OR CONTROL WORRYING: SEVERAL DAYS
3. WORRYING TOO MUCH ABOUT DIFFERENT THINGS: SEVERAL DAYS
6. BECOMING EASILY ANNOYED OR IRRITABLE: SEVERAL DAYS
1. FEELING NERVOUS, ANXIOUS, OR ON EDGE: SEVERAL DAYS
GAD7 TOTAL SCORE: 5
4. TROUBLE RELAXING: SEVERAL DAYS
GAD7 TOTAL SCORE: 5
7. FEELING AFRAID AS IF SOMETHING AWFUL MIGHT HAPPEN: NOT AT ALL

## 2025-08-13 ASSESSMENT — PAIN SCALES - GENERAL: PAINLEVEL_OUTOF10: NO PAIN (0)

## 2025-08-20 RX ORDER — LOSARTAN POTASSIUM 25 MG/1
25 TABLET ORAL DAILY
Qty: 90 TABLET | Refills: 1 | Status: SHIPPED | OUTPATIENT
Start: 2025-08-20

## 2025-08-20 RX ORDER — ATENOLOL AND CHLORTHALIDONE TABLET 100; 25 MG/1; MG/1
1 TABLET ORAL DAILY
Qty: 90 TABLET | Refills: 1 | Status: SHIPPED | OUTPATIENT
Start: 2025-08-20

## 2025-08-21 ENCOUNTER — LAB (OUTPATIENT)
Dept: LAB | Facility: CLINIC | Age: 40
End: 2025-08-21
Payer: COMMERCIAL

## 2025-08-21 DIAGNOSIS — O20.9 BLEEDING IN EARLY PREGNANCY: ICD-10-CM

## 2025-08-21 LAB — HCG INTACT+B SERPL-ACNC: 1 MIU/ML

## 2025-08-26 DIAGNOSIS — Z31.69 ENCOUNTER FOR PRECONCEPTION CONSULTATION: Primary | ICD-10-CM

## 2025-08-27 ENCOUNTER — LAB (OUTPATIENT)
Dept: LAB | Facility: CLINIC | Age: 40
End: 2025-08-27
Payer: COMMERCIAL

## 2025-08-27 DIAGNOSIS — O20.9 BLEEDING IN EARLY PREGNANCY: ICD-10-CM

## 2025-08-27 LAB — HCG INTACT+B SERPL-ACNC: <1 MIU/ML

## 2025-08-27 PROCEDURE — 36415 COLL VENOUS BLD VENIPUNCTURE: CPT | Performed by: PATHOLOGY

## 2025-08-27 PROCEDURE — 84702 CHORIONIC GONADOTROPIN TEST: CPT | Performed by: PATHOLOGY
